# Patient Record
Sex: FEMALE | Race: BLACK OR AFRICAN AMERICAN | NOT HISPANIC OR LATINO | Employment: FULL TIME | URBAN - METROPOLITAN AREA
[De-identification: names, ages, dates, MRNs, and addresses within clinical notes are randomized per-mention and may not be internally consistent; named-entity substitution may affect disease eponyms.]

---

## 2019-01-30 ENCOUNTER — APPOINTMENT (OUTPATIENT)
Dept: WOMENS IMAGING | Facility: HOSPITAL | Age: 49
End: 2019-01-30

## 2019-01-30 PROCEDURE — 77067 SCR MAMMO BI INCL CAD: CPT | Performed by: RADIOLOGY

## 2020-02-20 ENCOUNTER — APPOINTMENT (OUTPATIENT)
Dept: WOMENS IMAGING | Facility: HOSPITAL | Age: 50
End: 2020-02-20

## 2020-02-20 PROCEDURE — 77067 SCR MAMMO BI INCL CAD: CPT | Performed by: RADIOLOGY

## 2022-03-24 ENCOUNTER — APPOINTMENT (OUTPATIENT)
Dept: WOMENS IMAGING | Facility: HOSPITAL | Age: 52
End: 2022-03-24

## 2022-03-24 PROCEDURE — 77063 BREAST TOMOSYNTHESIS BI: CPT | Performed by: RADIOLOGY

## 2022-03-24 PROCEDURE — 77067 SCR MAMMO BI INCL CAD: CPT | Performed by: RADIOLOGY

## 2022-05-09 PROBLEM — K44.9 HIATAL HERNIA: Status: ACTIVE | Noted: 2022-05-09

## 2022-05-09 PROBLEM — R06.83 SNORING: Status: ACTIVE | Noted: 2022-05-09

## 2022-05-09 PROBLEM — D64.9 ANEMIA: Status: ACTIVE | Noted: 2022-05-09

## 2022-05-09 PROBLEM — M25.562 CHRONIC PAIN OF BOTH KNEES: Status: ACTIVE | Noted: 2022-05-09

## 2022-05-09 PROBLEM — G89.29 CHRONIC PAIN OF BOTH KNEES: Status: ACTIVE | Noted: 2022-05-09

## 2022-05-09 PROBLEM — M25.561 CHRONIC PAIN OF BOTH KNEES: Status: ACTIVE | Noted: 2022-05-09

## 2022-05-09 PROBLEM — M17.0 PRIMARY OSTEOARTHRITIS OF BOTH KNEES: Status: ACTIVE | Noted: 2019-10-03

## 2022-05-09 PROBLEM — M17.0 OSTEOARTHRITIS OF BOTH KNEES: Status: ACTIVE | Noted: 2021-04-27

## 2022-05-09 PROBLEM — E78.00 HYPERCHOLESTEROLEMIA: Status: ACTIVE | Noted: 2018-04-13

## 2022-05-09 PROBLEM — R53.82 CHRONIC FATIGUE: Status: ACTIVE | Noted: 2022-05-09

## 2022-05-09 PROBLEM — K44.9 HIATAL HERNIA: Status: RESOLVED | Noted: 2022-05-09 | Resolved: 2022-05-09

## 2022-05-11 ENCOUNTER — LAB (OUTPATIENT)
Dept: LAB | Facility: HOSPITAL | Age: 52
End: 2022-05-11

## 2022-05-11 ENCOUNTER — TRANSCRIBE ORDERS (OUTPATIENT)
Dept: ADMINISTRATIVE | Facility: HOSPITAL | Age: 52
End: 2022-05-11

## 2022-05-11 ENCOUNTER — HOSPITAL ENCOUNTER (OUTPATIENT)
Dept: CARDIOLOGY | Facility: HOSPITAL | Age: 52
Discharge: HOME | End: 2022-05-11

## 2022-05-11 ENCOUNTER — CONSULT (OUTPATIENT)
Dept: BARIATRICS/WEIGHT MGMT | Facility: CLINIC | Age: 52
End: 2022-05-11

## 2022-05-11 ENCOUNTER — HOSPITAL ENCOUNTER (OUTPATIENT)
Dept: GENERAL RADIOLOGY | Facility: HOSPITAL | Age: 52
Discharge: HOME | End: 2022-05-11

## 2022-05-11 VITALS
WEIGHT: 268 LBS | RESPIRATION RATE: 18 BRPM | SYSTOLIC BLOOD PRESSURE: 127 MMHG | TEMPERATURE: 97.7 F | HEIGHT: 63 IN | BODY MASS INDEX: 47.48 KG/M2 | DIASTOLIC BLOOD PRESSURE: 78 MMHG | HEART RATE: 85 BPM

## 2022-05-11 DIAGNOSIS — K21.9 CHALASIA OF LOWER ESOPHAGEAL SPHINCTER: ICD-10-CM

## 2022-05-11 DIAGNOSIS — K21.9 GASTROESOPHAGEAL REFLUX DISEASE, UNSPECIFIED WHETHER ESOPHAGITIS PRESENT: ICD-10-CM

## 2022-05-11 DIAGNOSIS — R53.82 CHRONIC FATIGUE: ICD-10-CM

## 2022-05-11 DIAGNOSIS — E66.01 OBESITY, CLASS III, BMI 40-49.9 (MORBID OBESITY): Primary | ICD-10-CM

## 2022-05-11 DIAGNOSIS — G89.29 CHRONIC PAIN OF BOTH KNEES: ICD-10-CM

## 2022-05-11 DIAGNOSIS — Z01.818 PRE-OP TESTING: ICD-10-CM

## 2022-05-11 DIAGNOSIS — Z01.818 PRE-OP TESTING: Primary | ICD-10-CM

## 2022-05-11 DIAGNOSIS — Z71.3 DIETARY COUNSELING: ICD-10-CM

## 2022-05-11 DIAGNOSIS — E66.01 MORBID OBESITY: ICD-10-CM

## 2022-05-11 DIAGNOSIS — M25.561 CHRONIC PAIN OF BOTH KNEES: ICD-10-CM

## 2022-05-11 DIAGNOSIS — E66.01 OBESITY, CLASS III, BMI 40-49.9 (MORBID OBESITY): ICD-10-CM

## 2022-05-11 DIAGNOSIS — Z01.818 PREOPERATIVE TESTING: ICD-10-CM

## 2022-05-11 DIAGNOSIS — M25.562 CHRONIC PAIN OF BOTH KNEES: ICD-10-CM

## 2022-05-11 PROBLEM — E78.00 HYPERCHOLESTEROLEMIA: Status: RESOLVED | Noted: 2018-04-13 | Resolved: 2022-05-11

## 2022-05-11 PROBLEM — D64.9 ANEMIA: Status: RESOLVED | Noted: 2022-05-09 | Resolved: 2022-05-11

## 2022-05-11 PROBLEM — M17.0 OSTEOARTHRITIS OF BOTH KNEES: Status: RESOLVED | Noted: 2021-04-27 | Resolved: 2022-05-11

## 2022-05-11 LAB
ALBUMIN SERPL-MCNC: 4.2 G/DL (ref 3.5–5.2)
ALBUMIN/GLOB SERPL: 1.4 G/DL
ALP SERPL-CCNC: 74 U/L (ref 39–117)
ALT SERPL W P-5'-P-CCNC: 12 U/L (ref 1–33)
ANION GAP SERPL CALCULATED.3IONS-SCNC: 12.5 MMOL/L (ref 5–15)
AST SERPL-CCNC: 19 U/L (ref 1–32)
BASOPHILS # BLD AUTO: 0.08 10*3/MM3 (ref 0–0.2)
BASOPHILS NFR BLD AUTO: 0.8 % (ref 0–1.5)
BILIRUB SERPL-MCNC: 0.2 MG/DL (ref 0–1.2)
BUN SERPL-MCNC: 19 MG/DL (ref 6–20)
BUN/CREAT SERPL: 24.4 (ref 7–25)
CALCIUM SPEC-SCNC: 9.3 MG/DL (ref 8.6–10.5)
CHLORIDE SERPL-SCNC: 103 MMOL/L (ref 98–107)
CO2 SERPL-SCNC: 23.5 MMOL/L (ref 22–29)
CREAT SERPL-MCNC: 0.78 MG/DL (ref 0.57–1)
DEPRECATED RDW RBC AUTO: 41.7 FL (ref 37–54)
EGFRCR SERPLBLD CKD-EPI 2021: 92.7 ML/MIN/1.73
EOSINOPHIL # BLD AUTO: 0.71 10*3/MM3 (ref 0–0.4)
EOSINOPHIL NFR BLD AUTO: 7.5 % (ref 0.3–6.2)
ERYTHROCYTE [DISTWIDTH] IN BLOOD BY AUTOMATED COUNT: 13 % (ref 12.3–15.4)
GLOBULIN UR ELPH-MCNC: 3 GM/DL
GLUCOSE SERPL-MCNC: 85 MG/DL (ref 65–99)
HBA1C MFR BLD: 5.9 % (ref 4.8–5.6)
HCT VFR BLD AUTO: 33.4 % (ref 34–46.6)
HGB BLD-MCNC: 11 G/DL (ref 12–15.9)
IMM GRANULOCYTES # BLD AUTO: 0.02 10*3/MM3 (ref 0–0.05)
IMM GRANULOCYTES NFR BLD AUTO: 0.2 % (ref 0–0.5)
LYMPHOCYTES # BLD AUTO: 4.17 10*3/MM3 (ref 0.7–3.1)
LYMPHOCYTES NFR BLD AUTO: 44 % (ref 19.6–45.3)
MCH RBC QN AUTO: 28.9 PG (ref 26.6–33)
MCHC RBC AUTO-ENTMCNC: 32.9 G/DL (ref 31.5–35.7)
MCV RBC AUTO: 87.9 FL (ref 79–97)
MONOCYTES # BLD AUTO: 0.83 10*3/MM3 (ref 0.1–0.9)
MONOCYTES NFR BLD AUTO: 8.8 % (ref 5–12)
NEUTROPHILS NFR BLD AUTO: 3.66 10*3/MM3 (ref 1.7–7)
NEUTROPHILS NFR BLD AUTO: 38.7 % (ref 42.7–76)
NRBC BLD AUTO-RTO: 0 /100 WBC (ref 0–0.2)
PLATELET # BLD AUTO: 329 10*3/MM3 (ref 140–450)
PMV BLD AUTO: 9.2 FL (ref 6–12)
POTASSIUM SERPL-SCNC: 4.1 MMOL/L (ref 3.5–5.2)
PROT SERPL-MCNC: 7.2 G/DL (ref 6–8.5)
RBC # BLD AUTO: 3.8 10*6/MM3 (ref 3.77–5.28)
SODIUM SERPL-SCNC: 139 MMOL/L (ref 136–145)
TSH SERPL DL<=0.05 MIU/L-ACNC: 1.94 UIU/ML (ref 0.27–4.2)
WBC NRBC COR # BLD: 9.47 10*3/MM3 (ref 3.4–10.8)

## 2022-05-11 PROCEDURE — 83036 HEMOGLOBIN GLYCOSYLATED A1C: CPT

## 2022-05-11 PROCEDURE — 36415 COLL VENOUS BLD VENIPUNCTURE: CPT

## 2022-05-11 PROCEDURE — 99205 OFFICE O/P NEW HI 60 MIN: CPT | Performed by: NURSE PRACTITIONER

## 2022-05-11 PROCEDURE — 71046 X-RAY EXAM CHEST 2 VIEWS: CPT

## 2022-05-11 PROCEDURE — 80050 GENERAL HEALTH PANEL: CPT

## 2022-05-11 RX ORDER — CHLORCYCLIZINE HYDROCHLORIDE AND PSEUDOEPHEDRINE HYDROCHLORIDE 25; 60 MG/1; MG/1
1 TABLET ORAL 2 TIMES DAILY
COMMUNITY
Start: 2022-04-11 | End: 2022-05-11 | Stop reason: ALTCHOICE

## 2022-05-11 RX ORDER — ACETAMINOPHEN 500 MG
500 TABLET ORAL EVERY 6 HOURS PRN
COMMUNITY

## 2022-05-11 RX ORDER — DOXYCYCLINE HYCLATE 100 MG/1
CAPSULE ORAL
COMMUNITY
Start: 2022-04-11 | End: 2022-05-11 | Stop reason: ALTCHOICE

## 2022-05-11 RX ORDER — MECLIZINE HYDROCHLORIDE 25 MG/1
25 TABLET ORAL
COMMUNITY
Start: 2022-04-11 | End: 2022-11-21

## 2022-05-11 NOTE — PROGRESS NOTES
"Bariatric Nutrition Counseling Interview    Patient Name: Jesika Angulo    YOB: 1970   Age: 51 y.o.  Sex: female  MRN: [id number]     Procedure considering: sleeve    Height: 63.39\"            Current weight: 268 lbs   BMI: 46.90 kg/m²                          Patient weight goal: 170 lbs  Reasons for desiring weight loss: improved health, less knee pain  Additional health issues to consider: GERD, knee pain    Weight History  Highest adult weight: 268 lbs                              Lowest adult weight: 211 lbs  Onset of obesity: adulthood  Possible triggers or life events that may have led to weight gain: pregnancy, working 3rd shift     Previous Weight Loss Efforts  Patient has tried to lose weight in the past including Weight Watchers, portion control, high protein, fasting, OTC weight loss aids, prescription medications and exercise. Patient has lost up to 30 lbs on diets, but was unable to maintain this long term.     Diet history revealed no diet restrictions or food allergies. Works 3rd shift. Diet recall: 3pm: coffee with sugar and creamer; 10:30pm: meat, vegetable and starch or spaghetti with salad (half portion); 12:30pm: other half of meal from earlier; 3am: yogurt, trail mix; 5am: glass of wine and popcorn.    Beverages: coffee, sweet tea, green tea, wine  Eating out: maybe once per week    Patient identifies problem areas to be physical hunger, late night eating and inactivity     Physical activity: walking daily on lunch breaks  Barriers to activity: knee pain     Pre and post op nutrition education completed and program materials provided. Emphasized the importance of taking in at least 70 g protein and 64 oz fluid daily. Discussed personal habits and lifestyle behaviors that may influence weight loss efforts. Patient verbalized understanding and questions were answered.  Short term goals: decrease/eliminate high calorie beverages    Additional nutrition counseling available and " encouraged both pre and post op.   Patient demonstrated a good comprehension of diet requirements and commitment to make healthy changes. Jesika Angulo appears to be a suitable candidate for bariatric surgery from a nutritional standpoint.      Alba Renteria RD  05/11/22  15:45 EDT

## 2022-05-11 NOTE — PROGRESS NOTES
[facility]RIC SURGERY  [address] [facility] [address]  [facility] [id number] [phone]  [address] [id number] [phone]  Dept: [phone]  5/11/2022      Jesika Angulo.  [id number]  [id number]  1970  female      Chief Complaint of weight gain; unable to maintain weight loss    History of Present Illness:   Jesika is a 51 y.o. female who presents today for evaluation, education and consultation regarding weight loss surgery. The patient is interested in the sleeve gastrectomy.      Diet History:Jesika has been overweight for at least 30 years, has been 35 pounds or more overweight for at least 30 years, has been 100 pounds or more overweight for unknown or more years and started dieting at age 31.  The most weight Jesika lost was 30 pounds on reduced calorie diet and exercise and maintained the weight loss for 20 years. Jesika describes her eating habits as snacking between meals.  Jesika Angulo has tried Weight Watchers, Physician monitored, reduced calorie, exercising, OTC medications, prescription medications and high protein diet among others with success of losing up to 30 pounds, but in each instance regained the weight.     See dietician documentation for complete history.    Bariatric Surgery Evaluation: The patient is being seen for an initial visit for bariatric surgery evaluation and education.     Bariatric Co-morbidities:  osteoarthritis, knee pain and GERD    Patient Active Problem List   Diagnosis   • Primary osteoarthritis of both knees   • Chronic fatigue   • Snoring   • Chronic pain of both knees   • GERD (gastroesophageal reflux disease)   • Obesity, Class III, BMI 40-49.9 (morbid obesity) ([facility])   • Dietary counseling       Past Medical History:   Diagnosis Date   • GERD (gastroesophageal reflux disease)    • Primary osteoarthritis of both knees    • Snoring        Past Surgical History:   Procedure Laterality  Date   • BREAST BIOPSY Left 2017   • TOE SURGERY     • TUBAL ABDOMINAL LIGATION     • VENTRAL HERNIA REPAIR  1996    with mesh       Allergies   Allergen Reactions   • Codeine GI Intolerance   • Hydrocodone GI Intolerance         Current Outpatient Medications:   •  acetaminophen (TYLENOL) 500 MG tablet, Take 500 mg by mouth Every 6 (Six) Hours As Needed., Disp: , Rfl:   •  meclizine (ANTIVERT) 25 MG tablet, Take 25 mg by mouth., Disp: , Rfl:   •  meloxicam (MOBIC) 15 MG tablet, Take 15 mg by mouth Daily. with food, Disp: , Rfl:   •  multivitamin (multivitamin) tablet tablet, Take 1 tablet by mouth Daily., Disp: , Rfl:   •  omeprazole (priLOSEC) 40 MG capsule, Take 40 mg by mouth Daily., Disp: , Rfl:   •  Probiotic Product (PROBIOTIC ADVANCED PO), Take  by mouth., Disp: , Rfl:     Social History     Socioeconomic History   • Marital status:    Tobacco Use   • Smoking status: Never Smoker   • Smokeless tobacco: Never Used   Vaping Use   • Vaping Use: Never used   Substance and Sexual Activity   • Alcohol use: Yes     Alcohol/week: 10.0 standard drinks     Types: 10 Standard drinks or equivalent per week     Comment: 5 times a week   • Drug use: No   • Sexual activity: Defer       Family History   Problem Relation Age of Onset   • Hypertension Mother    • Obesity Mother    • Breast cancer Mother    • Hypertension Sister    • Hypertension Brother    • Breast cancer Maternal Grandmother          Review of Systems:  Review of Systems   Constitutional: Positive for fatigue.   Respiratory: Negative for chest tightness and shortness of breath.    Cardiovascular: Negative for chest pain and palpitations.   Gastrointestinal: Negative for abdominal pain.        Reflux     Musculoskeletal: Positive for arthralgias and myalgias.   All other systems reviewed and are negative.      Physical Exam:  Vital Signs:  Weight: 122 kg (268 lb)   Body mass index is 46.9 kg/m².  Temp: 97.7 °F (36.5 °C)   Heart Rate: 85   BP: 127/78      Physical Exam  Vitals reviewed.   Constitutional:       General: She is not in acute distress.     Appearance: Normal appearance. She is morbidly obese.   HENT:      Head: Normocephalic and atraumatic.      Mouth/Throat:      Mouth: Mucous membranes are moist.   Eyes:      General: No scleral icterus.     Extraocular Movements: Extraocular movements intact.      Conjunctiva/sclera: Conjunctivae normal.      Pupils: Pupils are equal, round, and reactive to light.   Cardiovascular:      Rate and Rhythm: Normal rate and regular rhythm.      Heart sounds: Normal heart sounds.   Pulmonary:      Effort: Pulmonary effort is normal. No respiratory distress.      Breath sounds: Normal breath sounds.   Abdominal:      General: Bowel sounds are normal. There is no distension.      Palpations: Abdomen is soft. There is no mass.      Tenderness: There is no abdominal tenderness. There is no guarding.   Musculoskeletal:         General: Normal range of motion.      Cervical back: Normal range of motion and neck supple.   Skin:     General: Skin is warm and dry.   Neurological:      General: No focal deficit present.      Mental Status: She is alert and oriented to person, place, and time.   Psychiatric:         Mood and Affect: Mood normal.         Behavior: Behavior normal.         Thought Content: Thought content normal.         Judgment: Judgment normal.            Assessment:         Jesika Angulo is a 51 y.o. year old female with medically complicated severe obesity. Weight: 122 kg (268 lb), Body mass index is 46.9 kg/m². and weight related problems including osteoarthritis, knee pain and GERD.    I explained in detail, potential surgical options of interest to the patient including the RNY gastric bypass, sleeve gastrectomy, and gastric band while considering the patient's medical history. At this time, the patient expressed interest in the Laparoscopic Sleeve  All of those procedures can be performed laparoscopically  but there is a chance to convert to open if any technical challenges or complications do occur.  Bariatric surgery is not cosmetic surgery but rather a tool to help a patient make a life-long commitment lifestyle changes including diet, exercise, behavior changes, and taking supplemental vitamins and minerals.    Due to the patient's BMI, history, and co-morbidities related to potential surgical complications were evaluated. Due to Jesika Angulo's risk factors female will obtain the following prior to being scheduled for surgical intervention:    A letter of medical support as well as a history and physical must be obtained from her primary care provider. The patient was given a copy of a sample form, that will suffice as their letter to take to their primary are provider.    A referral for pre-operative psychological evaluation was ordered for the patient to evaluate candidacy as well as provide mental health support, should it be warranted before or after surgery.    Notes from primary care provider and prevous lab work was reviewed and  EKG, CXR, EGD with biopsy, psychology clearance, CBC, CMP, Hba1c and TSHwere ordered at this time. These will be drawn and patient will be notified with results. Patient will complete new, pre-operative radiology prior to being scheduled for surgery.     Jesika Angulo was screened for sleep apnea in our office today and based on their results she is low risk. The risks, as they relate to chronic hypercapnia r/t untreated PAMELA were discussed with the patient and She verbalized understanding.     A pre-operative diagnostic esophagogastroduodenoscopy with biopsy for evaluation will be ordered and scheduled for this patient. The risks and benefits of the procedure were discussed with the patient in detail and all questions were answered.  Possibility of perforation, bleeding, aspiration, anoxic brain injury, respiratory and/or cardiac arrest and death were discussed.   She received  handouts regarding, all questions were answered.     Jesika Angulo verbalized understanding related to COVID-19 pre-procedure testing policies and has consented to a preoperative test 48-72 hours before She's scheduled EGD and bariatric surgical procedure.     As this patient is a female who is post-menopausal or has undergone a surgical operation which precluded her from becoming pregnant she was not counseled related to conception and pregnancy.       The risks, benefits, alternatives, and potential complications of all of the sleeve gastrectomy explained in detail including, but not limited to death, anesthesia and medication adverse effect/DVT, pulmonary embolism, trocar site/incisional hernia, wound infection, abdominal infection, bleeding, failure to lose weight or gain weight and change in body image, metabolic complications with calcium, thiamine, vitamin B12, folate, iron, and anemia.    The patient was advised to start a high protein, low fat and low carbohydrate diet  start routine exercise including but not limited to 150 minutes per week. The patient received a resistance band along with a handout of exercises. The patient was given individualized information by our dietician along with handouts.     The patient was given information regarding the MARIO educational video. MARIO is an internet based educational video which explains the sourgical procedure and answers basic questions regarding the procedure. The patient was provided with instructions and a password to watch the video.    The patient understands the surgical procedures and the different surgical options that are available.  She understands the lifestyle changes that would be required after surgery and has agreed to participate in a pre-operative and postoperative weight management program.  She also expressed understanding of possible risks, had several questions answered and desires to proceed.    I think she is a good candidate for this  surgery, and is interested in a sleeve gastrectomy.    Encounter Diagnoses   Name Primary?   • Obesity, Class III, BMI 40-49.9 (morbid obesity) (HCC) Yes   • Gastroesophageal reflux disease, unspecified whether esophagitis present    • Chronic pain of both knees    • Chronic fatigue    • Dietary counseling        Plan:    Patient will be evaluated by a bariatric dietician psychologist before undergoing a multidisciplinary review of her candidacy. We discussed weight loss requirements prior to surgery and rationale, as well as other program requirements to ensure the safest approach to surgery. We spent time discussing different surgical procedures and plan of care throughout their lifespan to ensure long term success in achieving and maintaining a healthier weight. Patient will proceed with preoperative lab work, radiology, and endoscopy after obtaining agreed upon specialty, clearances, sleep study, and letter of support from her primary care provider.    Total time spent during this encounter today was 60 minutes and includes preparing for the visit, reviewing tests, performing a medically appropriate examination and/or evaluations, counseling and educating the patient/family/caregiver, ordering medications, tests, or procedures, documenting information in the medical record and independently interpreting results and communicating that information with the patient/family/caregiver.    Florina Ziegler, APRN  5/11/2022

## 2022-05-13 ENCOUNTER — PATIENT ROUNDING (BHMG ONLY) (OUTPATIENT)
Dept: BARIATRICS/WEIGHT MGMT | Facility: CLINIC | Age: 52
End: 2022-05-13

## 2022-05-13 NOTE — PROGRESS NOTES
Good afternoon,    My name is Holly Watts, I am the Practice Manager for [facility] Bariatric Surgery.         I want to officially welcome you to our practice and ask about your recent visit.         If you could tell me about your recent visit with us. What things went well?         We're always looking for ways to make our patients experiences even better. Do you have recommendations on ways we may improve?         I appreciate you taking the time to answer a few questions today.              Thank you, and have a great day.        Message was sent to the patient via [place] [facility] [place]

## 2022-06-16 ENCOUNTER — OFFICE VISIT (OUTPATIENT)
Dept: BARIATRICS/WEIGHT MGMT | Facility: CLINIC | Age: 52
End: 2022-06-16

## 2022-06-16 VITALS
HEIGHT: 63 IN | DIASTOLIC BLOOD PRESSURE: 84 MMHG | HEART RATE: 75 BPM | SYSTOLIC BLOOD PRESSURE: 134 MMHG | TEMPERATURE: 97.7 F | WEIGHT: 262 LBS | RESPIRATION RATE: 18 BRPM | BODY MASS INDEX: 46.42 KG/M2

## 2022-06-16 DIAGNOSIS — E66.01 OBESITY, CLASS III, BMI 40-49.9 (MORBID OBESITY): Primary | ICD-10-CM

## 2022-06-16 DIAGNOSIS — Z71.3 DIETARY COUNSELING: ICD-10-CM

## 2022-06-16 DIAGNOSIS — M17.0 PRIMARY OSTEOARTHRITIS OF BOTH KNEES: ICD-10-CM

## 2022-06-16 DIAGNOSIS — R06.83 SNORING: ICD-10-CM

## 2022-06-16 DIAGNOSIS — R53.82 CHRONIC FATIGUE: ICD-10-CM

## 2022-06-16 DIAGNOSIS — K21.9 GASTROESOPHAGEAL REFLUX DISEASE, UNSPECIFIED WHETHER ESOPHAGITIS PRESENT: ICD-10-CM

## 2022-06-16 PROCEDURE — 99213 OFFICE O/P EST LOW 20 MIN: CPT | Performed by: NURSE PRACTITIONER

## 2022-06-16 NOTE — PROGRESS NOTES
[facility] BARIATRIC SURGERY  [address] [address]  [phone]  [address] [id number] [phone]  Dept: [phone]  6/16/2022      Jesika Angulo.  [id number]  [id number]  1970  female      Chief Complaint   Patient presents with   • Consult     Diet 2/4       The patient is here for month 2 of their pre-operative physician supervised diet. Today's weight is 119 kg (262 lb) pounds and had a loss of 6 lbs. The patient states that she is following the recommendations given by our office and dietician including a high lean protein, low carb and low fat diet. We recommended adequate fruits and vegetable intake along with limited portion sizes. Patient is working on eliminating fast foods, fried foods, sweets and soda. Jesika Angulo has been increasing her daily water intake. She has been exercising: by walking and going to the gym.    Patient states they have made positive changes including increasing her water intake and practicing more mindful eating.  She has been able to increase her time at the gym and frequency as she just had injections in her knee and is able to move better.  She is reading food labels and trying to make healthier choices.        Review of Systems   Constitutional: Positive for activity change.   All other systems reviewed and are negative.    Vitals:    06/16/22 1637   BP: 134/84   Pulse: 75   Resp: 18   Temp: 97.7 °F (36.5 °C)     Patient Active Problem List   Diagnosis   • Primary osteoarthritis of both knees   • Chronic fatigue   • Snoring   • Chronic pain of both knees   • GERD (gastroesophageal reflux disease)   • Obesity, Class III, BMI 40-49.9 (morbid obesity) ([facility])   • Dietary counseling     Body mass index is 45.85 kg/m².    The following portions of the patient's history were reviewed and updated as appropriate: active problem list, medication list, allergies, notes from last  encounter, lab results, imaging    Physical Exam  Vitals reviewed.   Constitutional:       General: She is not in acute distress.     Appearance: Normal appearance. She is morbidly obese.   HENT:      Head: Normocephalic and atraumatic.      Right Ear: External ear normal.      Left Ear: External ear normal.      Nose: Nose normal.      Mouth/Throat:      Mouth: Mucous membranes are moist.      Pharynx: Oropharynx is clear.   Eyes:      General: No scleral icterus.     Extraocular Movements: Extraocular movements intact.      Conjunctiva/sclera: Conjunctivae normal.      Pupils: Pupils are equal, round, and reactive to light.   Cardiovascular:      Rate and Rhythm: Normal rate and regular rhythm.      Heart sounds: Normal heart sounds. No murmur heard.    No gallop.   Pulmonary:      Effort: Pulmonary effort is normal. No respiratory distress.   Abdominal:      General: Bowel sounds are normal.      Palpations: Abdomen is soft.   Musculoskeletal:         General: Normal range of motion.      Cervical back: Normal range of motion and neck supple.   Skin:     General: Skin is warm and dry.   Neurological:      General: No focal deficit present.      Mental Status: She is alert and oriented to person, place, and time.   Psychiatric:         Mood and Affect: Mood normal.         Behavior: Behavior normal.         Thought Content: Thought content normal.         Judgment: Judgment normal.         Discussion/Plan:  Obesity/Morbid Obesity: Currently the patient's weight is decreasing. There are no medications prescribed.Treatment plan includes prescribed diet, prescribed exercise regimen and behavior modification.    I reviewed the appropriate dietary choices with the patient and encouraged the necessary changes. Recommended at least 70 grams of protein per day, around 35 grams of fats and less than 100 grams of carbohydrates. Reviewed calorie intake if patient wanted to calorie count and/or had BMR. Instructed patient to  drink half of body weight in ounces per day and exercise a minimum of 150 minutes per week including both cardio and strength training. Discussed the option of keeping a food journal which will help patient become more aware of the nutritional value of foods so they are more prepared after surgery.    The patient was given written materials from our office for education.   I answered all of the patients questions regarding dietary changes, exercise or surgical options.  The patient will follow up in 1 month. The total time spent during this encounter was 20 minutes    Encounter Diagnoses   Name Primary?   • Obesity, Class III, BMI 40-49.9 (morbid obesity) ([facility]) Yes   • Gastroesophageal reflux disease, unspecified whether esophagitis present    • Primary osteoarthritis of both knees    • Snoring    • Chronic fatigue    • Dietary counseling

## 2022-06-21 ENCOUNTER — TRANSCRIBE ORDERS (OUTPATIENT)
Dept: ADMINISTRATIVE | Facility: HOSPITAL | Age: 52
End: 2022-06-21

## 2022-06-21 DIAGNOSIS — Z01.818 OTHER SPECIFIED PRE-OPERATIVE EXAMINATION: Primary | ICD-10-CM

## 2022-06-25 ENCOUNTER — LAB (OUTPATIENT)
Dept: LAB | Facility: HOSPITAL | Age: 52
End: 2022-06-25

## 2022-06-25 DIAGNOSIS — Z01.818 OTHER SPECIFIED PRE-OPERATIVE EXAMINATION: ICD-10-CM

## 2022-06-25 DIAGNOSIS — Z01.818 PREOPERATIVE TESTING: ICD-10-CM

## 2022-06-25 LAB — SARS-COV-2 ORF1AB RESP QL NAA+PROBE: NOT DETECTED

## 2022-06-25 PROCEDURE — C9803 HOPD COVID-19 SPEC COLLECT: HCPCS

## 2022-06-25 PROCEDURE — U0004 COV-19 TEST NON-CDC HGH THRU: HCPCS

## 2022-06-28 ENCOUNTER — ANESTHESIA EVENT (OUTPATIENT)
Dept: GASTROENTEROLOGY | Facility: HOSPITAL | Age: 52
End: 2022-06-28

## 2022-06-28 ENCOUNTER — HOSPITAL ENCOUNTER (OUTPATIENT)
Facility: HOSPITAL | Age: 52
Setting detail: HOSPITAL OUTPATIENT SURGERY
Discharge: HOME | End: 2022-06-28
Attending: SURGERY | Admitting: SURGERY

## 2022-06-28 ENCOUNTER — ANESTHESIA (OUTPATIENT)
Dept: GASTROENTEROLOGY | Facility: HOSPITAL | Age: 52
End: 2022-06-28

## 2022-06-28 VITALS
WEIGHT: 266.1 LBS | DIASTOLIC BLOOD PRESSURE: 99 MMHG | RESPIRATION RATE: 16 BRPM | SYSTOLIC BLOOD PRESSURE: 133 MMHG | OXYGEN SATURATION: 94 % | BODY MASS INDEX: 45.43 KG/M2 | HEIGHT: 64 IN | HEART RATE: 73 BPM

## 2022-06-28 DIAGNOSIS — K21.9 GASTROESOPHAGEAL REFLUX DISEASE, UNSPECIFIED WHETHER ESOPHAGITIS PRESENT: ICD-10-CM

## 2022-06-28 DIAGNOSIS — M25.561 CHRONIC PAIN OF BOTH KNEES: ICD-10-CM

## 2022-06-28 DIAGNOSIS — E66.01 OBESITY, CLASS III, BMI 40-49.9 (MORBID OBESITY): ICD-10-CM

## 2022-06-28 DIAGNOSIS — G89.29 CHRONIC PAIN OF BOTH KNEES: ICD-10-CM

## 2022-06-28 DIAGNOSIS — Z71.3 DIETARY COUNSELING: ICD-10-CM

## 2022-06-28 DIAGNOSIS — R53.82 CHRONIC FATIGUE: ICD-10-CM

## 2022-06-28 DIAGNOSIS — M25.562 CHRONIC PAIN OF BOTH KNEES: ICD-10-CM

## 2022-06-28 LAB
B-HCG UR QL: NEGATIVE
EXPIRATION DATE: NORMAL
INTERNAL NEGATIVE CONTROL: NORMAL
INTERNAL POSITIVE CONTROL: NORMAL
Lab: NORMAL

## 2022-06-28 PROCEDURE — 81025 URINE PREGNANCY TEST: CPT | Performed by: SURGERY

## 2022-06-28 PROCEDURE — 87081 CULTURE SCREEN ONLY: CPT | Performed by: SURGERY

## 2022-06-28 PROCEDURE — 43239 EGD BIOPSY SINGLE/MULTIPLE: CPT | Performed by: SURGERY

## 2022-06-28 PROCEDURE — 25010000002 PROPOFOL 10 MG/ML EMULSION: Performed by: ANESTHESIOLOGY

## 2022-06-28 RX ORDER — SODIUM CHLORIDE, SODIUM LACTATE, POTASSIUM CHLORIDE, CALCIUM CHLORIDE 600; 310; 30; 20 MG/100ML; MG/100ML; MG/100ML; MG/100ML
30 INJECTION, SOLUTION INTRAVENOUS CONTINUOUS PRN
Status: DISCONTINUED | OUTPATIENT
Start: 2022-06-28 | End: 2022-06-28 | Stop reason: HOSPADM

## 2022-06-28 RX ORDER — LIDOCAINE HYDROCHLORIDE 20 MG/ML
INJECTION, SOLUTION INFILTRATION; PERINEURAL AS NEEDED
Status: DISCONTINUED | OUTPATIENT
Start: 2022-06-28 | End: 2022-06-28 | Stop reason: SURG

## 2022-06-28 RX ORDER — PROPOFOL 10 MG/ML
VIAL (ML) INTRAVENOUS AS NEEDED
Status: DISCONTINUED | OUTPATIENT
Start: 2022-06-28 | End: 2022-06-28 | Stop reason: SURG

## 2022-06-28 RX ORDER — PROPOFOL 10 MG/ML
VIAL (ML) INTRAVENOUS CONTINUOUS PRN
Status: DISCONTINUED | OUTPATIENT
Start: 2022-06-28 | End: 2022-06-28 | Stop reason: SURG

## 2022-06-28 RX ADMIN — SODIUM CHLORIDE, POTASSIUM CHLORIDE, SODIUM LACTATE AND CALCIUM CHLORIDE 30 ML/HR: 600; 310; 30; 20 INJECTION, SOLUTION INTRAVENOUS at 07:56

## 2022-06-28 RX ADMIN — PROPOFOL 200 MG: 10 INJECTION, EMULSION INTRAVENOUS at 08:07

## 2022-06-28 RX ADMIN — Medication 200 MCG/KG/MIN: at 08:07

## 2022-06-28 RX ADMIN — LIDOCAINE HYDROCHLORIDE 60 MG: 20 INJECTION, SOLUTION INFILTRATION; PERINEURAL at 08:07

## 2022-06-28 NOTE — ANESTHESIA POSTPROCEDURE EVALUATION
Patient: Jesika Angulo    Procedure Summary     Date: 06/28/22 Room / Location:  [facility] /  [facility]    Anesthesia Start: 0802 Anesthesia Stop: 0815    Procedure: ESOPHAGOGASTRODUODENOSCOPY WITH BIOPSY (N/A Esophagus) Diagnosis:       Obesity, Class III, BMI 40-49.9 (morbid obesity) ([facility])      Gastroesophageal reflux disease, unspecified whether esophagitis present      Chronic pain of both knees      Chronic fatigue      Dietary counseling      (Obesity, Class III, BMI 40-49.9 (morbid obesity) ([facility]) [E66.01])      (Gastroesophageal reflux disease, unspecified whether esophagitis present [K21.9])      (Chronic pain of both knees [M25.561, M25.562, G89.29])      (Chronic fatigue [R53.82])      (Dietary counseling [Z71.3])    Surgeons: Lm Recio Jr., MD Provider: Stan Zepeda MD    Anesthesia Type: MAC ASA Status: 3          Anesthesia Type: MAC    Vitals  No vitals data found for the desired time range.          Post Anesthesia Care and Evaluation    Patient location during evaluation: PHASE II  Patient participation: complete - patient participated  Level of consciousness: awake and alert  Pain management: adequate    Airway patency: patent  Anesthetic complications: No anesthetic complications  PONV Status: none  Cardiovascular status: acceptable and hemodynamically stable  Respiratory status: acceptable, nonlabored ventilation and spontaneous ventilation  Hydration status: acceptable

## 2022-06-28 NOTE — ANESTHESIA PREPROCEDURE EVALUATION
Anesthesia Evaluation     Patient summary reviewed and Nursing notes reviewed                Airway   Mallampati: III  TM distance: >3 FB  Neck ROM: full  Possible difficult intubation  Dental - normal exam     Pulmonary - normal exam   Cardiovascular - normal exam        Neuro/Psych  GI/Hepatic/Renal/Endo    (+) obesity, morbid obesity, GERD,      Musculoskeletal     Abdominal   (+) obese,    Substance History      OB/GYN          Other   arthritis,                      Anesthesia Plan    ASA 3     MAC     intravenous induction     Anesthetic plan, risks, benefits, and alternatives have been provided, discussed and informed consent has been obtained with: patient.        CODE STATUS:

## 2022-06-29 ENCOUNTER — TELEPHONE (OUTPATIENT)
Dept: BARIATRICS/WEIGHT MGMT | Facility: CLINIC | Age: 52
End: 2022-06-29

## 2022-06-29 LAB — UREASE TISS QL: NEGATIVE

## 2022-06-29 NOTE — TELEPHONE ENCOUNTER
Inform about results EGD  ----- Message from Lm Recio Jr., MD sent at 6/29/2022 11:20 AM EDT -----  Please call patient with negative results.

## 2022-07-15 ENCOUNTER — HOSPITAL ENCOUNTER (OUTPATIENT)
Dept: CARDIOLOGY | Facility: HOSPITAL | Age: 52
Discharge: HOME | End: 2022-07-15
Admitting: NURSE PRACTITIONER

## 2022-07-15 ENCOUNTER — OFFICE VISIT (OUTPATIENT)
Dept: BARIATRICS/WEIGHT MGMT | Facility: CLINIC | Age: 52
End: 2022-07-15

## 2022-07-15 VITALS
DIASTOLIC BLOOD PRESSURE: 91 MMHG | WEIGHT: 261 LBS | TEMPERATURE: 97.8 F | BODY MASS INDEX: 46.25 KG/M2 | HEIGHT: 63 IN | HEART RATE: 81 BPM | RESPIRATION RATE: 17 BRPM | SYSTOLIC BLOOD PRESSURE: 129 MMHG

## 2022-07-15 DIAGNOSIS — R53.82 CHRONIC FATIGUE: ICD-10-CM

## 2022-07-15 DIAGNOSIS — M25.562 CHRONIC PAIN OF BOTH KNEES: ICD-10-CM

## 2022-07-15 DIAGNOSIS — G89.29 CHRONIC PAIN OF BOTH KNEES: ICD-10-CM

## 2022-07-15 DIAGNOSIS — Z71.3 DIETARY COUNSELING: ICD-10-CM

## 2022-07-15 DIAGNOSIS — M25.561 CHRONIC PAIN OF BOTH KNEES: ICD-10-CM

## 2022-07-15 DIAGNOSIS — E66.01 OBESITY, CLASS III, BMI 40-49.9 (MORBID OBESITY): Primary | ICD-10-CM

## 2022-07-15 DIAGNOSIS — E66.01 OBESITY, CLASS III, BMI 40-49.9 (MORBID OBESITY): ICD-10-CM

## 2022-07-15 DIAGNOSIS — K21.9 GASTROESOPHAGEAL REFLUX DISEASE, UNSPECIFIED WHETHER ESOPHAGITIS PRESENT: ICD-10-CM

## 2022-07-15 DIAGNOSIS — R06.83 SNORING: ICD-10-CM

## 2022-07-15 LAB — QT INTERVAL: 406 MS

## 2022-07-15 PROCEDURE — 93010 ELECTROCARDIOGRAM REPORT: CPT | Performed by: INTERNAL MEDICINE

## 2022-07-15 PROCEDURE — 99213 OFFICE O/P EST LOW 20 MIN: CPT | Performed by: NURSE PRACTITIONER

## 2022-07-15 PROCEDURE — 93005 ELECTROCARDIOGRAM TRACING: CPT | Performed by: NURSE PRACTITIONER

## 2022-07-15 NOTE — PROGRESS NOTES
[facility]ATR[facility] BARIATRIC SURGERY  [address] [facility] [address]  [facility] [address]  [phone]  [address] [id number] [phone]  Dept: [phone]  7/15/2022      Jesika Angulo.  [id number]  [id number]  1970  female      Chief Complaint   Patient presents with   • Follow-up     Diet 3/4       The patient is here for month 3 of their pre-operative physician supervised diet. Today's weight is 118 kg (261 lb) pounds and had a loss of 1 lbs. The patient states that she is following the recommendations given by our office and dietician including a high lean protein, low carb and low fat diet. We recommended adequate fruits and vegetable intake along with limited portion sizes. Patient is working on eliminating fast foods, fried foods, sweets and soda. Jesika Angulo has been increasing her daily water intake. She has been exercising: by walking.  She has been walking every morning and evening and then 3 times per week she does an extra workout.  Exercise is somewhat limited due to knee pain.    Patient states they have made positive changes including changing over her creamer to sugar free creamer.  She has decreased the amount of sugar that she is drinking.  She has tried protein shakes and know brands and flavors of ones that she likes.  She is reading food labels and focusing on high protein foods.      Review of Systems   Constitutional: Positive for activity change and appetite change.   All other systems reviewed and are negative.    Vitals:    07/15/22 1012   BP: 129/91   Pulse: 81   Resp: 17   Temp: 97.8 °F (36.6 °C)     Patient Active Problem List   Diagnosis   • Primary osteoarthritis of both knees   • Chronic fatigue   • Snoring   • Chronic pain of both knees   • GERD (gastroesophageal reflux disease)   • Obesity, Class III, BMI 40-49.9 (morbid obesity) (HCC)   • Dietary counseling     Body mass index is 45.67 kg/m².    The  following portions of the patient's history were reviewed and updated as appropriate: active problem list, medication list, allergies, notes from last encounter, lab results, endoscopy procedure notes, imaging    Physical Exam  Vitals reviewed.   Constitutional:       General: She is not in acute distress.     Appearance: Normal appearance. She is morbidly obese.   HENT:      Head: Normocephalic and atraumatic.      Right Ear: External ear normal.      Left Ear: External ear normal.      Nose: Nose normal.      Mouth/Throat:      Mouth: Mucous membranes are moist.      Pharynx: Oropharynx is clear.   Eyes:      General: No scleral icterus.     Extraocular Movements: Extraocular movements intact.      Conjunctiva/sclera: Conjunctivae normal.      Pupils: Pupils are equal, round, and reactive to light.   Cardiovascular:      Rate and Rhythm: Normal rate and regular rhythm.      Heart sounds: Normal heart sounds. No murmur heard.    No gallop.   Pulmonary:      Effort: Pulmonary effort is normal. No respiratory distress.   Abdominal:      General: Bowel sounds are normal.      Palpations: Abdomen is soft.   Musculoskeletal:         General: Normal range of motion.      Cervical back: Normal range of motion and neck supple.   Skin:     General: Skin is warm and dry.   Neurological:      General: No focal deficit present.      Mental Status: She is alert and oriented to person, place, and time.   Psychiatric:         Mood and Affect: Mood normal.         Behavior: Behavior normal.         Thought Content: Thought content normal.         Judgment: Judgment normal.         Discussion/Plan:  Obesity/Morbid Obesity: Currently the patient's weight is decreasing. There are no medications prescribed.Treatment plan includes prescribed diet, prescribed exercise regimen and behavior modification.    I reviewed the appropriate dietary choices with the patient and encouraged the necessary changes. Recommended at least 70 grams of  protein per day, around 35 grams of fats and less than 100 grams of carbohydrates. Reviewed calorie intake if patient wanted to calorie count and/or had BMR. Instructed patient to drink half of body weight in ounces per day and exercise a minimum of 150 minutes per week including both cardio and strength training. Discussed the option of keeping a food journal which will help patient become more aware of the nutritional value of foods so they are more prepared after surgery.    The patient was given written materials from our office for education.   I answered all of the patients questions regarding dietary changes, exercise or surgical options.  The patient will follow up in 1 month. The total time spent during this encounter was 20 minutes    Encounter Diagnoses   Name Primary?   • Obesity, Class III, BMI 40-49.9 (morbid obesity) ([facility]) Yes   • Gastroesophageal reflux disease, unspecified whether esophagitis present    • Chronic pain of both knees    • Snoring    • Chronic fatigue    • Dietary counseling

## 2022-08-30 ENCOUNTER — OFFICE VISIT (OUTPATIENT)
Dept: BARIATRICS/WEIGHT MGMT | Facility: CLINIC | Age: 52
End: 2022-08-30

## 2022-08-30 VITALS
TEMPERATURE: 97.1 F | WEIGHT: 270 LBS | SYSTOLIC BLOOD PRESSURE: 142 MMHG | BODY MASS INDEX: 47.84 KG/M2 | DIASTOLIC BLOOD PRESSURE: 93 MMHG | HEIGHT: 63 IN | HEART RATE: 80 BPM

## 2022-08-30 DIAGNOSIS — K21.9 GASTROESOPHAGEAL REFLUX DISEASE, UNSPECIFIED WHETHER ESOPHAGITIS PRESENT: ICD-10-CM

## 2022-08-30 DIAGNOSIS — E66.01 OBESITY, CLASS III, BMI 40-49.9 (MORBID OBESITY): Primary | ICD-10-CM

## 2022-08-30 DIAGNOSIS — R53.82 CHRONIC FATIGUE: ICD-10-CM

## 2022-08-30 DIAGNOSIS — Z71.3 DIETARY COUNSELING: ICD-10-CM

## 2022-08-30 DIAGNOSIS — G89.29 CHRONIC PAIN OF BOTH KNEES: ICD-10-CM

## 2022-08-30 DIAGNOSIS — M25.561 CHRONIC PAIN OF BOTH KNEES: ICD-10-CM

## 2022-08-30 DIAGNOSIS — M17.0 PRIMARY OSTEOARTHRITIS OF BOTH KNEES: ICD-10-CM

## 2022-08-30 DIAGNOSIS — M25.562 CHRONIC PAIN OF BOTH KNEES: ICD-10-CM

## 2022-08-30 DIAGNOSIS — R06.83 SNORING: ICD-10-CM

## 2022-08-30 PROCEDURE — 99213 OFFICE O/P EST LOW 20 MIN: CPT | Performed by: NURSE PRACTITIONER

## 2022-08-30 NOTE — PROGRESS NOTES
[facility] BARIATR[facility] BARIATRIC SURGERY  [address] [address]  [phone]  [address] [address]  [phone]  Dept: [phone]  8/30/2022      Jesika Angulo.  [id number]  [id number]  1970  female      Chief Complaint   Patient presents with   • Obesity     Diet  4/4       The patient is here for month 4 of their pre-operative physician supervised diet. Today's weight is 122 kg (270 lb) pounds and had a gain of 9 lbs. The patient states that she is following the recommendations given by our office and dietician including a high lean protein, low carb and low fat diet. We recommended adequate fruits and vegetable intake along with limited portion sizes. Patient is working on eliminating fast foods, fried foods, sweets and soda. Jesika Angulo has been increasing her daily water intake. She has been exercising: by walking.    Patient states they have made positive changes including increasing water intake and decreasing sugar intake.    The patient admits to be struggling with illness.  Over the last month she has been sick with COVID and a sinus infection.  She was placed on steroids which she just now finished.    Review of Systems   Constitutional: Positive for appetite change.   All other systems reviewed and are negative.    Vitals:    08/30/22 0921   BP: 142/93   Pulse: 80   Temp: 97.1 °F (36.2 °C)     Patient Active Problem List   Diagnosis   • Primary osteoarthritis of both knees   • Chronic fatigue   • Snoring   • Chronic pain of both knees   • GERD (gastroesophageal reflux disease)   • Obesity, Class III, BMI 40-49.9 (morbid obesity) ([facility])   • Dietary counseling     Body mass index is 47.25 kg/m².    The following portions of the patient's history were reviewed and updated as appropriate: active problem list, medication list, allergies, notes from last encounter, notes from last 4 encounters, lab results,  endoscopy procedure notes, imaging    Physical Exam  Vitals reviewed.   Constitutional:       General: She is not in acute distress.     Appearance: Normal appearance. She is morbidly obese.   HENT:      Head: Normocephalic and atraumatic.      Mouth/Throat:      Mouth: Mucous membranes are moist.      Pharynx: Oropharynx is clear.   Eyes:      General: No scleral icterus.     Extraocular Movements: Extraocular movements intact.      Conjunctiva/sclera: Conjunctivae normal.      Pupils: Pupils are equal, round, and reactive to light.   Cardiovascular:      Rate and Rhythm: Normal rate and regular rhythm.      Heart sounds: Normal heart sounds. No murmur heard.    No gallop.   Pulmonary:      Effort: Pulmonary effort is normal. No respiratory distress.   Abdominal:      General: Bowel sounds are normal.      Palpations: Abdomen is soft.   Musculoskeletal:         General: Normal range of motion.      Cervical back: Normal range of motion and neck supple.   Skin:     General: Skin is warm and dry.   Neurological:      General: No focal deficit present.      Mental Status: She is alert and oriented to person, place, and time.   Psychiatric:         Mood and Affect: Mood normal.         Behavior: Behavior normal.         Thought Content: Thought content normal.         Judgment: Judgment normal.         Discussion/Plan:  Obesity/Morbid Obesity: Currently the patient's weight is stable. There are no medications prescribed.Treatment plan includes prescribed diet, prescribed exercise regimen and behavior modification.    I reviewed the appropriate dietary choices with the patient and encouraged the necessary changes. Recommended at least 70 grams of protein per day, around 35 grams of fats and less than 100 grams of carbohydrates. Reviewed calorie intake if patient wanted to calorie count and/or had BMR. Instructed patient to drink half of body weight in ounces per day and exercise a minimum of 150 minutes per week  including both cardio and strength training. Discussed the option of keeping a food journal which will help patient become more aware of the nutritional value of foods so they are more prepared after surgery.    The patient was given written materials from our office for education.   I answered all of the patients questions regarding dietary changes, exercise or surgical options.  The patient will follow up in 1 month. The total time spent during this encounter was 20 minutes    Encounter Diagnoses   Name Primary?   • Obesity, Class III, BMI 40-49.9 (morbid obesity) ([facility]) Yes   • Gastroesophageal reflux disease, unspecified whether esophagitis present    • Primary osteoarthritis of both knees    • Chronic pain of both knees    • Snoring    • Chronic fatigue    • Dietary counseling

## 2022-09-27 ENCOUNTER — TELEPHONE (OUTPATIENT)
Dept: BARIATRICS/WEIGHT MGMT | Facility: CLINIC | Age: 52
End: 2022-09-27

## 2022-10-03 ENCOUNTER — TELEPHONE (OUTPATIENT)
Dept: BARIATRICS/WEIGHT MGMT | Facility: CLINIC | Age: 52
End: 2022-10-03

## 2022-10-04 ENCOUNTER — TELEPHONE (OUTPATIENT)
Dept: BARIATRICS/WEIGHT MGMT | Facility: CLINIC | Age: 52
End: 2022-10-04

## 2022-12-07 ENCOUNTER — PREP FOR SURGERY (OUTPATIENT)
Dept: OTHER | Facility: HOSPITAL | Age: 52
End: 2022-12-07

## 2022-12-07 DIAGNOSIS — E66.01 OBESITY, CLASS III, BMI 40-49.9 (MORBID OBESITY): Primary | ICD-10-CM

## 2022-12-07 RX ORDER — SODIUM CHLORIDE 0.9 % (FLUSH) 0.9 %
3-10 SYRINGE (ML) INJECTION AS NEEDED
Status: CANCELLED | OUTPATIENT
Start: 2022-12-28

## 2022-12-07 RX ORDER — CEFAZOLIN SODIUM IN 0.9 % NACL 3 G/100 ML
3 INTRAVENOUS SOLUTION, PIGGYBACK (ML) INTRAVENOUS ONCE
Status: CANCELLED | OUTPATIENT
Start: 2022-12-28 | End: 2022-12-07

## 2022-12-07 RX ORDER — SODIUM CHLORIDE 9 MG/ML
40 INJECTION, SOLUTION INTRAVENOUS AS NEEDED
Status: CANCELLED | OUTPATIENT
Start: 2022-12-28

## 2022-12-07 RX ORDER — GABAPENTIN 250 MG/5ML
300 SOLUTION ORAL ONCE
Status: CANCELLED | OUTPATIENT
Start: 2022-12-28 | End: 2022-12-07

## 2022-12-07 RX ORDER — PANTOPRAZOLE SODIUM 40 MG/10ML
40 INJECTION, POWDER, LYOPHILIZED, FOR SOLUTION INTRAVENOUS ONCE
Status: CANCELLED | OUTPATIENT
Start: 2022-12-28 | End: 2022-12-07

## 2022-12-07 RX ORDER — SODIUM CHLORIDE 0.9 % (FLUSH) 0.9 %
3 SYRINGE (ML) INJECTION EVERY 12 HOURS SCHEDULED
Status: CANCELLED | OUTPATIENT
Start: 2022-12-07

## 2022-12-07 RX ORDER — SCOLOPAMINE TRANSDERMAL SYSTEM 1 MG/1
1 PATCH, EXTENDED RELEASE TRANSDERMAL CONTINUOUS
Status: CANCELLED | OUTPATIENT
Start: 2022-12-28 | End: 2022-12-31

## 2022-12-07 RX ORDER — PROMETHAZINE HYDROCHLORIDE 12.5 MG/1
12.5 TABLET ORAL ONCE AS NEEDED
Status: CANCELLED | OUTPATIENT
Start: 2022-12-28

## 2022-12-07 RX ORDER — PROMETHAZINE HYDROCHLORIDE 25 MG/1
25 SUPPOSITORY RECTAL ONCE AS NEEDED
Status: CANCELLED | OUTPATIENT
Start: 2022-12-28

## 2022-12-07 RX ORDER — SODIUM CHLORIDE, SODIUM LACTATE, POTASSIUM CHLORIDE, CALCIUM CHLORIDE 600; 310; 30; 20 MG/100ML; MG/100ML; MG/100ML; MG/100ML
100 INJECTION, SOLUTION INTRAVENOUS CONTINUOUS
Status: CANCELLED | OUTPATIENT
Start: 2022-12-28

## 2022-12-07 RX ORDER — METOCLOPRAMIDE HYDROCHLORIDE 5 MG/ML
10 INJECTION INTRAMUSCULAR; INTRAVENOUS ONCE
Status: CANCELLED | OUTPATIENT
Start: 2022-12-28 | End: 2022-12-07

## 2022-12-07 RX ORDER — CHLORHEXIDINE GLUCONATE 0.12 MG/ML
15 RINSE ORAL SEE ADMIN INSTRUCTIONS
Status: CANCELLED | OUTPATIENT
Start: 2022-12-28

## 2022-12-15 ENCOUNTER — CONSULT (OUTPATIENT)
Dept: BARIATRICS/WEIGHT MGMT | Facility: CLINIC | Age: 52
End: 2022-12-15

## 2022-12-15 VITALS — HEART RATE: 84 BPM | SYSTOLIC BLOOD PRESSURE: 145 MMHG | DIASTOLIC BLOOD PRESSURE: 92 MMHG | TEMPERATURE: 97.3 F

## 2022-12-15 DIAGNOSIS — M25.561 CHRONIC PAIN OF BOTH KNEES: ICD-10-CM

## 2022-12-15 DIAGNOSIS — G89.29 CHRONIC PAIN OF BOTH KNEES: ICD-10-CM

## 2022-12-15 DIAGNOSIS — M25.562 CHRONIC PAIN OF BOTH KNEES: ICD-10-CM

## 2022-12-15 DIAGNOSIS — K44.9 HIATAL HERNIA: ICD-10-CM

## 2022-12-15 DIAGNOSIS — Z71.3 DIETARY COUNSELING: ICD-10-CM

## 2022-12-15 DIAGNOSIS — K21.9 GASTROESOPHAGEAL REFLUX DISEASE, UNSPECIFIED WHETHER ESOPHAGITIS PRESENT: ICD-10-CM

## 2022-12-15 DIAGNOSIS — R53.82 CHRONIC FATIGUE: ICD-10-CM

## 2022-12-15 DIAGNOSIS — E66.01 OBESITY, CLASS III, BMI 40-49.9 (MORBID OBESITY): Primary | ICD-10-CM

## 2022-12-15 PROCEDURE — 99215 OFFICE O/P EST HI 40 MIN: CPT | Performed by: SURGERY

## 2022-12-15 RX ORDER — ENOXAPARIN SODIUM 100 MG/ML
40 INJECTION SUBCUTANEOUS EVERY 12 HOURS SCHEDULED
Qty: 11.2 ML | Refills: 0 | Status: SHIPPED | OUTPATIENT
Start: 2022-12-15 | End: 2022-12-30

## 2022-12-15 RX ORDER — URSODIOL 300 MG/1
300 CAPSULE ORAL 2 TIMES DAILY
Qty: 60 CAPSULE | Refills: 5 | Status: SHIPPED | OUTPATIENT
Start: 2022-12-15

## 2022-12-15 RX ORDER — CYANOCOBALAMIN/FOLIC AC/VIT B6 1-2.5-25MG
TABLET ORAL
Qty: 90 TABLET | OUTPATIENT
Start: 2022-12-15

## 2022-12-15 NOTE — H&P (VIEW-ONLY)
Bariatric Consult:  Referred by Lee Best MD    Jesika Angulo is here today for consult on Consult and Weight Loss (Sleeve consultation)      History of Present Illness:     Jesika Angulo is a 52 y.o. female with morbid obesity with co-morbidities including GERD who presents for surgical consultation for the above procedure. Jesika has completed the initial intake visit and has been examined by our nurse practitioner, dietician, psychologist and underwent the extensive educational teaching process under the guidance of our bariatric coordinator and myself. Jesika also has seen or if has not yet will see the educational video MARIO on the surgical procedure if available. Jesika attended today more educational teaching from our bariatric coordinator and myself. Jesika has had an extensive medical workup including a visit with their primary care physician, EKG, chest radiograph, blood work, EGD or UGI and possibly further testing. These have been reviewed by me and discussed with the patient. Jesika is now ready to proceed with surgery. Jesika presently denies nausea, vomiting, fever, chills, chest pain, shortness of air, melena, hematochezia, hemetemesis, dysuria, frequency, hematuria.       Past Medical History:   Diagnosis Date   • GERD (gastroesophageal reflux disease)    • Primary osteoarthritis of both knees    • Snoring        Encounter Diagnoses   Name Primary?   • Obesity, Class III, BMI 40-49.9 (morbid obesity) ([facility]) Yes   • Dietary counseling    • Gastroesophageal reflux disease, unspecified whether esophagitis present    • Hiatal hernia    • Chronic fatigue    • Chronic pain of both knees        Past Surgical History:   Procedure Laterality Date   • BREAST BIOPSY Left 2017   • ENDOSCOPY N/A 6/28/2022    Procedure: ESOPHAGOGASTRODUODENOSCOPY WITH BIOPSY;  Surgeon: Lm Recio Jr., MD;  Location: [facility] ENDOSCOPY;  Service: General;  Laterality: N/A;  PRE- GERD  POST- GASTRITIS   • TOE  SURGERY     • TUBAL ABDOMINAL LIGATION     • VENTRAL HERNIA REPAIR  1996    with mesh       Patient Active Problem List   Diagnosis   • Primary osteoarthritis of both knees   • Chronic fatigue   • Snoring   • Hiatal hernia   • Chronic pain of both knees   • GERD (gastroesophageal reflux disease)   • Obesity, Class III, BMI 40-49.9 (morbid obesity) ([facility])   • Dietary counseling       Allergies   Allergen Reactions   • Codeine GI Intolerance   • Hydrocodone GI Intolerance         Current Outpatient Medications:   •  acetaminophen (TYLENOL) 500 MG tablet, Take 500 mg by mouth Every 6 (Six) Hours As Needed., Disp: , Rfl:   •  multivitamin (THERAGRAN) tablet tablet, Take 1 tablet by mouth Daily., Disp: , Rfl:   •  omeprazole (priLOSEC) 40 MG capsule, Take 40 mg by mouth Daily., Disp: , Rfl:   •  Probiotic Product (PROBIOTIC ADVANCED PO), Take  by mouth., Disp: , Rfl:   •  brompheniramine-pseudoephedrine-DM 30-2-10 MG/5ML syrup, Take 5 mL by mouth 4 (Four) Times a Day As Needed for Congestion or Cough., Disp: 120 mL, Rfl: 0  •  Enoxaparin Sodium (LOVENOX) 40 MG/0.4ML solution prefilled syringe syringe, Inject 0.4 mL under the skin into the appropriate area as directed Every 12 (Twelve) Hours for 14 days. Start after surgery unless instructed otherwise, Disp: 11.2 mL, Rfl: 0  •  folic acid-vit B6-vit B12 (FOLBEE) 2.5-25-1 MG tablet tablet, Take 1 tablet by mouth Daily., Disp: 40 tablet, Rfl: 0  •  ursodiol (Actigall) 300 MG capsule, Take 1 capsule by mouth 2 (Two) Times a Day., Disp: 60 capsule, Rfl: 5    Social History     Socioeconomic History   • Marital status:    Tobacco Use   • Smoking status: Never   • Smokeless tobacco: Never   Vaping Use   • Vaping Use: Never used   Substance and Sexual Activity   • Alcohol use: Yes     Alcohol/week: 10.0 standard drinks     Types: 10 Standard drinks or equivalent per week     Comment: 5 times a week   • Drug use: No   • Sexual activity: Defer       Family History   Problem  Relation Age of Onset   • Hypertension Mother    • Obesity Mother    • Breast cancer Mother    • Hypertension Sister    • Hypertension Brother    • Breast cancer Maternal Grandmother    • Malig Hyperthermia Neg Hx        Review of Systems:  Review of Systems   Constitutional: Positive for fatigue.   Genitourinary: Positive for dysuria.   Musculoskeletal: Positive for arthralgias.   All other systems reviewed and are negative.        Physical Exam:    Vital Signs:      There is no height or weight on file to calculate BMI.  Temp: 97.3 °F (36.3 °C)   Heart Rate: 84   BP: 145/92       Physical Exam  Vitals reviewed.   HENT:      Head: Normocephalic and atraumatic.      Mouth/Throat:      Mouth: Mucous membranes are moist.      Pharynx: Oropharynx is clear.   Eyes:      General: No scleral icterus.     Extraocular Movements: Extraocular movements intact.      Conjunctiva/sclera: Conjunctivae normal.      Pupils: Pupils are equal, round, and reactive to light.   Neck:      Thyroid: No thyromegaly.   Cardiovascular:      Rate and Rhythm: Normal rate.   Pulmonary:      Effort: Pulmonary effort is normal. No respiratory distress.      Breath sounds: Normal breath sounds. No stridor. No wheezing or rhonchi.   Abdominal:      General: Bowel sounds are normal.      Palpations: Abdomen is soft.      Tenderness: There is no abdominal tenderness. There is no right CVA tenderness, left CVA tenderness, guarding or rebound.      Hernia: No hernia is present.   Musculoskeletal:         General: Normal range of motion.      Cervical back: Normal range of motion and neck supple.   Lymphadenopathy:      Cervical: No cervical adenopathy.   Skin:     General: Skin is warm and dry.      Findings: No erythema.   Neurological:      Mental Status: She is alert and oriented to person, place, and time.   Psychiatric:         Mood and Affect: Mood normal.         Behavior: Behavior normal.         Thought Content: Thought content normal.          Judgment: Judgment normal.           Assessment:    Jesika Angulo is a 52 y.o. year old female with medically complicated severe obesity with a BMI of There is no height or weight on file to calculate BMI. and multiple co-morbidities listed in the encounter diagnosis.    I think she is an appropriate candidate for this surgery, and is ready to proceed.      Plan/Discussion/Summary:  Small to moderate size hiatal hernia per me.  Patient does take PPI.  H. pylori negative.  Patient complains of dysuria and instructed patient to talk to her PCP today for further evaluation.    The patient has returned to the office for a surgical consultation and has requested to proceed with gastric sleeve.  I have had the opportunity to obtain a history, examine the patient and review the patient's chart. The procedure could be done laparoscopically and or robotically.    The patient understands that surgery is a tool and that weight loss is not guaranteed but only seen in the context of appropriate use, regular follow up, exercise and making appropriate food choices.     I personally discussed the potential complications of the laparoscopic gastric sleeve with this patient.  The patient is well aware of potential complications of the surgery that include but not limited to bleeding, infections, deep vein thrombosis, pulmonary embolism, pulmonary complications such as pneumonia, cardiac event, hernias, small bowel obstruction, damage to the spleen or other organs, bowel injury, disfiguring scars, failure to lose weight, need for additional surgery, conversion to an open procedure and death.  The patient is also aware of complications which apply in particular to the gastric sleeve and can include but not limited to the leakage of gastric contents at the staple line, the development of an intra-abdominal abscess, gastroesophageal reflux disease, Henning's esophagus, ulcers, vitamin/mineral deficiencies, strictures, and the  possibility of converting this procedure to a Sonja-en-Y gastric bypass. The patient also understands the possibility of requiring an acid reducer medication for the rest of their life.    The risks, benefits, potential complications and alternative therapies were discussed at great length as outlined in our extensive consent forms, online consent and educational teaching processes.    The patient has confirmed the participation in the programs extensive educational activities.    All questions and concerns were answered to patient's satisfaction.  The patient now wishes to proceed with surgery.     The patient has agreed to a postoperative course of anitcoagulant therapy.      I instructed patient that the surgery could be laparoscopically and/or robotically.    I instructed patient to start on a H2 blocker or proton pump inhibitor if not already on one of these medications.    I explained in detail the procedures that are in the consent.  All of these procedures have a chance to convert to open if any technical challenges or complications do occur.  Bariatric surgery is not cosmetic surgery but rather a tool to help a patient make a life-long commitment lifestyle change including diet, exercise, behavior changes, and taking supplemental vitamins and minerals.    Problems after surgery may require more operations to correct them.    The risks, benefits, alternatives, and potential complications of all of the procedures were explained in detail including, but not limited to death, anesthesia and medication adverse effect, deep venous thrombosis, pulmonary embolism, trocar site/incisional hernia, wound infection, abdominal infection, bleeding, failure to lose weight, gain weight, a change in body image, metabolic complications with vitamin deficiences and anemia.    Weight loss expectations were discussed with the patient in detail. The weight loss operations most commonly performed are the sleeve gastrectomy and the  Sonja-en-Y gastric bypass. These operations result in weight losses up to approximately 25-35% of initial body weight 12 to 24 months after surgery with the gastric bypass usually the higher percent of weight loss but depends on patient using the tool.    For the gastric bypass and loop duodenal switch (KELTON-S) the risks include but not limited to the following early complications:  Anastomotic leak/peritonitis, Sonja/Alimentary/biliopancreatic limb obstruction, severe & minor wound infection/seroma, and nausea/vomiting.  Late complications can include but are not limited to malnutrition, vitamin deficiencies, frequent loose stools,  stomal stenosis, marginal ulcer, bowel obstruction, intussusception, internal, and incisional hernia.    Regarding the gastric sleeve, there is higher risk of dysphagia and reflux leading to possible Henning's esophagus compared to a gastric bypass, as well as risk of internal visceral/organ injury, splenectomy, bleeding, infection, leak (which could require further intervention possible conversion to Sonja-en-Y gastric bypass), stenosis and possibility of regaining weight.    Jesika was counseled regarding diagnostic results, instructions for management, risk factor reductions, prognosis, patient and family education, impressions, risks and benefits of treatment options and importance of compliance with treatment. Total time of the encounter was over 45 minutes counseling the patient regarding the procedure as above and reviewing as well as ordering labs, medications and the procedure.  The chart was also reviewed prior to seeing the patient reviewing previous testing, studies and labs.    Jesika understands the surgical procedures and the different surgical options that are available.  She understands the lifestyle changes that are required after surgery and has agreed to follow the guidelines outlined in the weight management program.  She also expressed understanding of the risks  involved and had all of female questions answered and desires to proceed.      Lm Recio MD  12/15/2022

## 2022-12-15 NOTE — PATIENT INSTRUCTIONS
Bariatric Manual    You were provided a manual specific to the procedure that you have chosen.  Please refer to that with any questions or call the office at [phone]

## 2022-12-15 NOTE — H&P
Bariatric Consult:  Referred by Lee Best MD    Jesika Angulo is here today for consult on Consult and Weight Loss (Sleeve consultation)      History of Present Illness:     Jesika Angulo is a 52 y.o. female with morbid obesity with co-morbidities including GERD who presents for surgical consultation for the above procedure. Jesika has completed the initial intake visit and has been examined by our nurse practitioner, dietician, psychologist and underwent the extensive educational teaching process under the guidance of our bariatric coordinator and myself. Jesika also has seen or if has not yet will see the educational video MARIO on the surgical procedure if available. Jesika attended today more educational teaching from our bariatric coordinator and myself. Jesika has had an extensive medical workup including a visit with their primary care physician, EKG, chest radiograph, blood work, EGD or UGI and possibly further testing. These have been reviewed by me and discussed with the patient. Jesika is now ready to proceed with surgery. Jesika presently denies nausea, vomiting, fever, chills, chest pain, shortness of air, melena, hematochezia, hemetemesis, dysuria, frequency, hematuria.       Past Medical History:   Diagnosis Date   • GERD (gastroesophageal reflux disease)    • Primary osteoarthritis of both knees    • Snoring        Encounter Diagnoses   Name Primary?   • Obesity, Class III, BMI 40-49.9 (morbid obesity) ([facility]) Yes   • Dietary counseling    • Gastroesophageal reflux disease, unspecified whether esophagitis present    • Hiatal hernia    • Chronic fatigue    • Chronic pain of both knees        Past Surgical History:   Procedure Laterality Date   • BREAST BIOPSY Left 2017   • ENDOSCOPY N/A 6/28/2022    Procedure: ESOPHAGOGASTRODUODENOSCOPY WITH BIOPSY;  Surgeon: Lm Recio Jr., MD;  Location: [facility] ENDOSCOPY;  Service: General;  Laterality: N/A;  PRE- GERD  POST- GASTRITIS   • TOE  SURGERY     • TUBAL ABDOMINAL LIGATION     • VENTRAL HERNIA REPAIR  1996    with mesh       Patient Active Problem List   Diagnosis   • Primary osteoarthritis of both knees   • Chronic fatigue   • Snoring   • Hiatal hernia   • Chronic pain of both knees   • GERD (gastroesophageal reflux disease)   • Obesity, Class III, BMI 40-49.9 (morbid obesity) ([facility])   • Dietary counseling       Allergies   Allergen Reactions   • Codeine GI Intolerance   • Hydrocodone GI Intolerance         Current Outpatient Medications:   •  acetaminophen (TYLENOL) 500 MG tablet, Take 500 mg by mouth Every 6 (Six) Hours As Needed., Disp: , Rfl:   •  multivitamin (THERAGRAN) tablet tablet, Take 1 tablet by mouth Daily., Disp: , Rfl:   •  omeprazole (priLOSEC) 40 MG capsule, Take 40 mg by mouth Daily., Disp: , Rfl:   •  Probiotic Product (PROBIOTIC ADVANCED PO), Take  by mouth., Disp: , Rfl:   •  brompheniramine-pseudoephedrine-DM 30-2-10 MG/5ML syrup, Take 5 mL by mouth 4 (Four) Times a Day As Needed for Congestion or Cough., Disp: 120 mL, Rfl: 0  •  Enoxaparin Sodium (LOVENOX) 40 MG/0.4ML solution prefilled syringe syringe, Inject 0.4 mL under the skin into the appropriate area as directed Every 12 (Twelve) Hours for 14 days. Start after surgery unless instructed otherwise, Disp: 11.2 mL, Rfl: 0  •  folic acid-vit B6-vit B12 (FOLBEE) 2.5-25-1 MG tablet tablet, Take 1 tablet by mouth Daily., Disp: 40 tablet, Rfl: 0  •  ursodiol (Actigall) 300 MG capsule, Take 1 capsule by mouth 2 (Two) Times a Day., Disp: 60 capsule, Rfl: 5    Social History     Socioeconomic History   • Marital status:    Tobacco Use   • Smoking status: Never   • Smokeless tobacco: Never   Vaping Use   • Vaping Use: Never used   Substance and Sexual Activity   • Alcohol use: Yes     Alcohol/week: 10.0 standard drinks     Types: 10 Standard drinks or equivalent per week     Comment: 5 times a week   • Drug use: No   • Sexual activity: Defer       Family History   Problem  Relation Age of Onset   • Hypertension Mother    • Obesity Mother    • Breast cancer Mother    • Hypertension Sister    • Hypertension Brother    • Breast cancer Maternal Grandmother    • Malig Hyperthermia Neg Hx        Review of Systems:  Review of Systems   Constitutional: Positive for fatigue.   Genitourinary: Positive for dysuria.   Musculoskeletal: Positive for arthralgias.   All other systems reviewed and are negative.        Physical Exam:    Vital Signs:      There is no height or weight on file to calculate BMI.  Temp: 97.3 °F (36.3 °C)   Heart Rate: 84   BP: 145/92       Physical Exam  Vitals reviewed.   HENT:      Head: Normocephalic and atraumatic.      Mouth/Throat:      Mouth: Mucous membranes are moist.      Pharynx: Oropharynx is clear.   Eyes:      General: No scleral icterus.     Extraocular Movements: Extraocular movements intact.      Conjunctiva/sclera: Conjunctivae normal.      Pupils: Pupils are equal, round, and reactive to light.   Neck:      Thyroid: No thyromegaly.   Cardiovascular:      Rate and Rhythm: Normal rate.   Pulmonary:      Effort: Pulmonary effort is normal. No respiratory distress.      Breath sounds: Normal breath sounds. No stridor. No wheezing or rhonchi.   Abdominal:      General: Bowel sounds are normal.      Palpations: Abdomen is soft.      Tenderness: There is no abdominal tenderness. There is no right CVA tenderness, left CVA tenderness, guarding or rebound.      Hernia: No hernia is present.   Musculoskeletal:         General: Normal range of motion.      Cervical back: Normal range of motion and neck supple.   Lymphadenopathy:      Cervical: No cervical adenopathy.   Skin:     General: Skin is warm and dry.      Findings: No erythema.   Neurological:      Mental Status: She is alert and oriented to person, place, and time.   Psychiatric:         Mood and Affect: Mood normal.         Behavior: Behavior normal.         Thought Content: Thought content normal.          Judgment: Judgment normal.           Assessment:    Jesika Angulo is a 52 y.o. year old female with medically complicated severe obesity with a BMI of There is no height or weight on file to calculate BMI. and multiple co-morbidities listed in the encounter diagnosis.    I think she is an appropriate candidate for this surgery, and is ready to proceed.      Plan/Discussion/Summary:  Small to moderate size hiatal hernia per me.  Patient does take PPI.  H. pylori negative.  Patient complains of dysuria and instructed patient to talk to her PCP today for further evaluation.    The patient has returned to the office for a surgical consultation and has requested to proceed with gastric sleeve.  I have had the opportunity to obtain a history, examine the patient and review the patient's chart. The procedure could be done laparoscopically and or robotically.    The patient understands that surgery is a tool and that weight loss is not guaranteed but only seen in the context of appropriate use, regular follow up, exercise and making appropriate food choices.     I personally discussed the potential complications of the laparoscopic gastric sleeve with this patient.  The patient is well aware of potential complications of the surgery that include but not limited to bleeding, infections, deep vein thrombosis, pulmonary embolism, pulmonary complications such as pneumonia, cardiac event, hernias, small bowel obstruction, damage to the spleen or other organs, bowel injury, disfiguring scars, failure to lose weight, need for additional surgery, conversion to an open procedure and death.  The patient is also aware of complications which apply in particular to the gastric sleeve and can include but not limited to the leakage of gastric contents at the staple line, the development of an intra-abdominal abscess, gastroesophageal reflux disease, Henning's esophagus, ulcers, vitamin/mineral deficiencies, strictures, and the  possibility of converting this procedure to a Sonja-en-Y gastric bypass. The patient also understands the possibility of requiring an acid reducer medication for the rest of their life.    The risks, benefits, potential complications and alternative therapies were discussed at great length as outlined in our extensive consent forms, online consent and educational teaching processes.    The patient has confirmed the participation in the programs extensive educational activities.    All questions and concerns were answered to patient's satisfaction.  The patient now wishes to proceed with surgery.     The patient has agreed to a postoperative course of anitcoagulant therapy.      I instructed patient that the surgery could be laparoscopically and/or robotically.    I instructed patient to start on a H2 blocker or proton pump inhibitor if not already on one of these medications.    I explained in detail the procedures that are in the consent.  All of these procedures have a chance to convert to open if any technical challenges or complications do occur.  Bariatric surgery is not cosmetic surgery but rather a tool to help a patient make a life-long commitment lifestyle change including diet, exercise, behavior changes, and taking supplemental vitamins and minerals.    Problems after surgery may require more operations to correct them.    The risks, benefits, alternatives, and potential complications of all of the procedures were explained in detail including, but not limited to death, anesthesia and medication adverse effect, deep venous thrombosis, pulmonary embolism, trocar site/incisional hernia, wound infection, abdominal infection, bleeding, failure to lose weight, gain weight, a change in body image, metabolic complications with vitamin deficiences and anemia.    Weight loss expectations were discussed with the patient in detail. The weight loss operations most commonly performed are the sleeve gastrectomy and the  Sonja-en-Y gastric bypass. These operations result in weight losses up to approximately 25-35% of initial body weight 12 to 24 months after surgery with the gastric bypass usually the higher percent of weight loss but depends on patient using the tool.    For the gastric bypass and loop duodenal switch (KELTON-S) the risks include but not limited to the following early complications:  Anastomotic leak/peritonitis, Sonja/Alimentary/biliopancreatic limb obstruction, severe & minor wound infection/seroma, and nausea/vomiting.  Late complications can include but are not limited to malnutrition, vitamin deficiencies, frequent loose stools,  stomal stenosis, marginal ulcer, bowel obstruction, intussusception, internal, and incisional hernia.    Regarding the gastric sleeve, there is higher risk of dysphagia and reflux leading to possible Henning's esophagus compared to a gastric bypass, as well as risk of internal visceral/organ injury, splenectomy, bleeding, infection, leak (which could require further intervention possible conversion to Sonja-en-Y gastric bypass), stenosis and possibility of regaining weight.    Jesika was counseled regarding diagnostic results, instructions for management, risk factor reductions, prognosis, patient and family education, impressions, risks and benefits of treatment options and importance of compliance with treatment. Total time of the encounter was over 45 minutes counseling the patient regarding the procedure as above and reviewing as well as ordering labs, medications and the procedure.  The chart was also reviewed prior to seeing the patient reviewing previous testing, studies and labs.    Jesika understands the surgical procedures and the different surgical options that are available.  She understands the lifestyle changes that are required after surgery and has agreed to follow the guidelines outlined in the weight management program.  She also expressed understanding of the risks  involved and had all of female questions answered and desires to proceed.      Lm Recio MD  12/15/2022

## 2022-12-20 ENCOUNTER — PRE-ADMISSION TESTING (OUTPATIENT)
Dept: PREADMISSION TESTING | Facility: HOSPITAL | Age: 52
End: 2022-12-20

## 2022-12-20 VITALS
DIASTOLIC BLOOD PRESSURE: 90 MMHG | SYSTOLIC BLOOD PRESSURE: 128 MMHG | OXYGEN SATURATION: 97 % | TEMPERATURE: 97.8 F | HEIGHT: 64 IN | RESPIRATION RATE: 16 BRPM | HEART RATE: 88 BPM | BODY MASS INDEX: 46.35 KG/M2

## 2022-12-20 DIAGNOSIS — E66.01 OBESITY, CLASS III, BMI 40-49.9 (MORBID OBESITY): ICD-10-CM

## 2022-12-20 LAB
ALBUMIN SERPL-MCNC: 4.3 G/DL (ref 3.5–5.2)
ALBUMIN/GLOB SERPL: 1.3 G/DL
ALP SERPL-CCNC: 79 U/L (ref 39–117)
ALT SERPL W P-5'-P-CCNC: 11 U/L (ref 1–33)
ANION GAP SERPL CALCULATED.3IONS-SCNC: 9 MMOL/L (ref 5–15)
AST SERPL-CCNC: 17 U/L (ref 1–32)
BILIRUB SERPL-MCNC: 0.3 MG/DL (ref 0–1.2)
BUN SERPL-MCNC: 21 MG/DL (ref 6–20)
BUN/CREAT SERPL: 25.9 (ref 7–25)
CALCIUM SPEC-SCNC: 9.3 MG/DL (ref 8.6–10.5)
CHLORIDE SERPL-SCNC: 102 MMOL/L (ref 98–107)
CO2 SERPL-SCNC: 26 MMOL/L (ref 22–29)
CREAT SERPL-MCNC: 0.81 MG/DL (ref 0.57–1)
DEPRECATED RDW RBC AUTO: 39 FL (ref 37–54)
EGFRCR SERPLBLD CKD-EPI 2021: 88 ML/MIN/1.73
ERYTHROCYTE [DISTWIDTH] IN BLOOD BY AUTOMATED COUNT: 12.4 % (ref 12.3–15.4)
GLOBULIN UR ELPH-MCNC: 3.3 GM/DL
GLUCOSE SERPL-MCNC: 84 MG/DL (ref 65–99)
HCT VFR BLD AUTO: 33.8 % (ref 34–46.6)
HGB BLD-MCNC: 11.6 G/DL (ref 12–15.9)
MCH RBC QN AUTO: 29.3 PG (ref 26.6–33)
MCHC RBC AUTO-ENTMCNC: 34.3 G/DL (ref 31.5–35.7)
MCV RBC AUTO: 85.4 FL (ref 79–97)
PLATELET # BLD AUTO: 349 10*3/MM3 (ref 140–450)
PMV BLD AUTO: 8.8 FL (ref 6–12)
POTASSIUM SERPL-SCNC: 3.8 MMOL/L (ref 3.5–5.2)
PROT SERPL-MCNC: 7.6 G/DL (ref 6–8.5)
RBC # BLD AUTO: 3.96 10*6/MM3 (ref 3.77–5.28)
SODIUM SERPL-SCNC: 137 MMOL/L (ref 136–145)
WBC NRBC COR # BLD: 7.98 10*3/MM3 (ref 3.4–10.8)

## 2022-12-20 PROCEDURE — 80053 COMPREHEN METABOLIC PANEL: CPT

## 2022-12-20 PROCEDURE — 85027 COMPLETE CBC AUTOMATED: CPT

## 2022-12-20 PROCEDURE — 36415 COLL VENOUS BLD VENIPUNCTURE: CPT

## 2022-12-20 NOTE — DISCHARGE INSTRUCTIONS
Take only the following medications the morning of surgery:     NO MEDS AM OF SURGERY    TIME OF ARRIVAL 7:30      Do not take Bariatric Vitamins, Folic Acid, Actigall (if applicable) or Lovenox Injections (if applicable) the morning of surgery.  If you have a history of blood clots or have a BMI greater than 50, Dr. Recio may order Lovenox for after surgery. Do not take Lovenox blood thinner before surgery.      General Instructions:    Liquids only the day before surgery.  Drink one 20 ounce Gatorade G2 the evening before surgery.  Nothing red in color.   The morning of surgery have another 20 ounce Gatorade G2.  Again, nothing red in color.  Your drink must be completed 2 hours before your arrival time.   Patients who avoid smoking, chewing tobacco and alcohol for 4 weeks prior to surgery have a reduced risk of post-operative complications.  Quit smoking as many days before surgery as you can.  Do not smoke, use chewing tobacco or drink alcohol the day of surgery.   Bring any papers given to you in the doctor's office.  Wear clean comfortable clothes.  Do not wear contact lenses, false eyelashes or make-up.  Bring a case for your glasses.   Bring crutches or walker if applicable.  Remove all piercings.  Leave jewelry and any other valuables at home.  Remove fingernail polish, gel overlays or any artificial nails.  Hair extensions with metal clips must be removed prior to surgery.  The Pre-Admission Testing nurse will instruct you to bring medications if unable to obtain an accurate list in Pre-Admission Testing.      Preventing a Surgical Site Infection:  For 2 to 3 days before surgery, avoid shaving with a razor because the razor can irritate skin and make it easier to develop an infection.    Any areas of open skin can increase the risk of a post-operative wound infection by allowing bacteria to enter and travel throughout the body.  Notify your surgeon if you have any skin wounds / rashes even if it is not  near the expected surgical site.  The area will need assessed to determine if surgery should be delayed until it is healed.  2 days prior to surgery, take a shower using a fresh bar of anti-bacterial soap (such as Dial).  Use a clean washcloth and dry with a clean towel.    The day prior to surgery, take a shower using a fresh bar of anti-bacterial soap (such as Dial).  Use a clean washcloth and dry with a clean towel.  Sleep in a clean bed with clean clothing.  Do not allow pets to sleep with you.  The morning of surgery shower using a fresh bar of anti-bacterial soap (such as Dial).  Use a clean washcloth and dry with a clean towel.  Follow the Chlorhexidine instructions below.    CHLORHEXIDINE CLOTH INSTRUCTIONS  The morning of surgery follow these instructions using the Chlorhexidine cloths you've been given.  These steps reduce bacteria on the body.  Do not use the cloths near your eyes, ears mouth, genitalia or on open wounds.  Throw the cloths away after use but do not try to flush them down a toilet.    Open and remove one cloth at a time from the package.    Leave the cloth unfolded and begin the bathing.  Massage the skin with the cloths using gentle pressure to remove bacteria.  Do not scrub harshly.   Follow the steps below with one 2% CHG cloth per area (6 total cloths).  One cloth for neck, shoulders and chest.  One cloth for both arms, hands, fingers and underarms (do underarms last).  One cloth for the abdomen followed by groin.  One cloth for right leg and foot including between the toes.  One cloth for left leg and foot including between the toes.  The last cloth is to be used for the back of the neck, back and buttocks.    Allow the CHG to air dry 3 minutes on the skin which will give it time to work and decrease the chance of irritation.  The skin may feel sticky until it is dry.  Do not rinse with water or any other liquid or you will lose the beneficial effects of the CHG.  If mild skin  irritation occurs, do rinse the skin to remove the CHG.  Report this to the nurse at time of admission.  Do not apply lotions, creams, ointments, deodorants or perfumes after using the clothes. Dress in clean clothes before coming to the hospital.    Ask your surgeon if you will be receiving antibiotics prior to surgery.  Make sure you, your family, and all healthcare providers clean their hands with soap and water or an alcohol based hand  before caring for you or your wound.      Day of surgery:  Your arrival time is approximately two hours before your scheduled surgery time.  Upon arrival, a Pre-op nurse and Anesthesiologist will review your health history, obtain vital signs, and answer questions you may have.  A Pre-op nurse will start an IV and you may receive medication in preparation for surgery, including something to help you relax.  If applicable, we do ask that you have your C-PAP/BI-PAP machine available. It can be utilized the night of surgery.     Please be aware that surgery does come with discomfort.  We want to make every effort to control your discomfort so please discuss any uncontrolled symptoms with your nurse.   Your doctor will most likely have prescribed pain medications.      If you are going home after surgery you will receive individualized written care instructions before being discharged.  A responsible adult must drive you to and from the hospital on the day of your surgery and stay with you for 24 hours.  Discharge prescriptions can be filled by the hospital pharmacy during regular pharmacy hours.  If you are having surgery late in the day/evening your prescription may be e-prescribed to your pharmacy.  Please verify your pharmacy hours or chose a 24 hour pharmacy to avoid not having access to your prescription because your pharmacy has closed for the day.    If you are staying overnight following surgery, you will be transported to your hospital room following the recovery  period.  [facility] has all private rooms.    If you have any questions please call Pre-Admission Testing at [phone].  Deductibles and co-payments are collected on the day of service. Please be prepared to pay the required co-pay, deductible or deposit on the day of service as defined by your plan.    Call your surgeon immediately if you experience any of the following symptoms:  Sore Throat  Shortness of Breath or difficulty breathing  Cough  Chills  Body soreness or muscle pain  Headache  Fever  New loss of taste or smell  Do not arrive for your surgery ill.  Your procedure will need to be rescheduled to another time.  You will need to call your physician before the day of surgery to avoid any unnecessary exposure to hospital staff as well as other patients.

## 2022-12-27 RX ORDER — ACETAMINOPHEN 10 MG/ML
1000 INJECTION, SOLUTION INTRAVENOUS ONCE
Status: CANCELLED | OUTPATIENT
Start: 2022-12-28

## 2022-12-28 ENCOUNTER — ANESTHESIA EVENT (OUTPATIENT)
Dept: PERIOP | Facility: HOSPITAL | Age: 52
DRG: 621 | End: 2022-12-28
Payer: COMMERCIAL

## 2022-12-28 ENCOUNTER — ANESTHESIA (OUTPATIENT)
Dept: PERIOP | Facility: HOSPITAL | Age: 52
DRG: 621 | End: 2022-12-28
Payer: COMMERCIAL

## 2022-12-28 ENCOUNTER — HOSPITAL ENCOUNTER (INPATIENT)
Facility: HOSPITAL | Age: 52
LOS: 1 days | Discharge: HOME | DRG: 621 | End: 2022-12-29
Attending: SURGERY | Admitting: SURGERY
Payer: COMMERCIAL

## 2022-12-28 DIAGNOSIS — E66.01 OBESITY, CLASS III, BMI 40-49.9 (MORBID OBESITY): ICD-10-CM

## 2022-12-28 PROCEDURE — 25010000002 PROPOFOL 10 MG/ML EMULSION: Performed by: NURSE ANESTHETIST, CERTIFIED REGISTERED

## 2022-12-28 PROCEDURE — 43775 LAP SLEEVE GASTRECTOMY: CPT | Performed by: SURGERY

## 2022-12-28 PROCEDURE — 25010000002 HYDROMORPHONE PER 4 MG: Performed by: SURGERY

## 2022-12-28 PROCEDURE — 25010000002 METOCLOPRAMIDE PER 10 MG: Performed by: SURGERY

## 2022-12-28 PROCEDURE — 25010000002 MIDAZOLAM PER 1 MG: Performed by: ANESTHESIOLOGY

## 2022-12-28 PROCEDURE — 25010000002 CLONIDINE PER 1 MG: Performed by: SURGERY

## 2022-12-28 PROCEDURE — 25010000002 ONDANSETRON PER 1 MG: Performed by: NURSE ANESTHETIST, CERTIFIED REGISTERED

## 2022-12-28 PROCEDURE — 25010000002 KETOROLAC TROMETHAMINE PER 15 MG: Performed by: SURGERY

## 2022-12-28 PROCEDURE — 25010000002 ONDANSETRON PER 1 MG: Performed by: SURGERY

## 2022-12-28 PROCEDURE — 25010000002 DEXAMETHASONE SODIUM PHOSPHATE 20 MG/5ML SOLUTION: Performed by: NURSE ANESTHETIST, CERTIFIED REGISTERED

## 2022-12-28 PROCEDURE — 25010000002 FENTANYL CITRATE (PF) 50 MCG/ML SOLUTION: Performed by: NURSE ANESTHETIST, CERTIFIED REGISTERED

## 2022-12-28 PROCEDURE — 25010000002 ROPIVACAINE PER 1 MG: Performed by: SURGERY

## 2022-12-28 PROCEDURE — 25010000002 FENTANYL CITRATE (PF) 100 MCG/2ML SOLUTION: Performed by: NURSE ANESTHETIST, CERTIFIED REGISTERED

## 2022-12-28 PROCEDURE — 25010000002 MAGNESIUM SULFATE PER 500 MG OF MAGNESIUM: Performed by: NURSE ANESTHETIST, CERTIFIED REGISTERED

## 2022-12-28 PROCEDURE — 0BQT4ZZ REPAIR DIAPHRAGM, PERCUTANEOUS ENDOSCOPIC APPROACH: ICD-10-PCS | Performed by: SURGERY

## 2022-12-28 PROCEDURE — 43775 LAP SLEEVE GASTRECTOMY: CPT | Performed by: NURSE PRACTITIONER

## 2022-12-28 PROCEDURE — 25010000002 EPINEPHRINE 1 MG/ML SOLUTION 30 ML VIAL: Performed by: SURGERY

## 2022-12-28 PROCEDURE — 0DB64Z3 EXCISION OF STOMACH, PERCUTANEOUS ENDOSCOPIC APPROACH, VERTICAL: ICD-10-PCS | Performed by: SURGERY

## 2022-12-28 PROCEDURE — 88307 TISSUE EXAM BY PATHOLOGIST: CPT | Performed by: SURGERY

## 2022-12-28 PROCEDURE — 25010000002 CEFAZOLIN PER 500 MG: Performed by: SURGERY

## 2022-12-28 DEVICE — IMPLANTABLE DEVICE
Type: IMPLANTABLE DEVICE | Site: STOMACH | Status: FUNCTIONAL
Brand: TITAN SGS STANDARD GASTRIC STAPLER

## 2022-12-28 RX ORDER — PROMETHAZINE HYDROCHLORIDE 25 MG/1
12.5 TABLET ORAL ONCE AS NEEDED
Status: DISCONTINUED | OUTPATIENT
Start: 2022-12-28 | End: 2022-12-28 | Stop reason: HOSPADM

## 2022-12-28 RX ORDER — SODIUM CHLORIDE, SODIUM LACTATE, POTASSIUM CHLORIDE, CALCIUM CHLORIDE 600; 310; 30; 20 MG/100ML; MG/100ML; MG/100ML; MG/100ML
100 INJECTION, SOLUTION INTRAVENOUS CONTINUOUS
Status: DISCONTINUED | OUTPATIENT
Start: 2022-12-28 | End: 2022-12-28

## 2022-12-28 RX ORDER — LIDOCAINE HYDROCHLORIDE 10 MG/ML
0.5 INJECTION, SOLUTION EPIDURAL; INFILTRATION; INTRACAUDAL; PERINEURAL ONCE AS NEEDED
Status: DISCONTINUED | OUTPATIENT
Start: 2022-12-28 | End: 2022-12-28

## 2022-12-28 RX ORDER — DIPHENHYDRAMINE HYDROCHLORIDE 50 MG/ML
25 INJECTION INTRAMUSCULAR; INTRAVENOUS EVERY 4 HOURS PRN
Status: DISCONTINUED | OUTPATIENT
Start: 2022-12-28 | End: 2022-12-29 | Stop reason: HOSPADM

## 2022-12-28 RX ORDER — PROMETHAZINE HYDROCHLORIDE 12.5 MG/1
12.5 TABLET ORAL EVERY 4 HOURS PRN
Status: DISCONTINUED | OUTPATIENT
Start: 2022-12-28 | End: 2022-12-29 | Stop reason: HOSPADM

## 2022-12-28 RX ORDER — CEFAZOLIN SODIUM IN 0.9 % NACL 3 G/100 ML
3 INTRAVENOUS SOLUTION, PIGGYBACK (ML) INTRAVENOUS ONCE
Status: COMPLETED | OUTPATIENT
Start: 2022-12-28 | End: 2022-12-28

## 2022-12-28 RX ORDER — DIPHENHYDRAMINE HCL 25 MG
25 CAPSULE ORAL
Status: DISCONTINUED | OUTPATIENT
Start: 2022-12-28 | End: 2022-12-28 | Stop reason: HOSPADM

## 2022-12-28 RX ORDER — DEXAMETHASONE SODIUM PHOSPHATE 4 MG/ML
INJECTION, SOLUTION INTRA-ARTICULAR; INTRALESIONAL; INTRAMUSCULAR; INTRAVENOUS; SOFT TISSUE AS NEEDED
Status: DISCONTINUED | OUTPATIENT
Start: 2022-12-28 | End: 2022-12-28 | Stop reason: SURG

## 2022-12-28 RX ORDER — MAGNESIUM SULFATE HEPTAHYDRATE 500 MG/ML
INJECTION, SOLUTION INTRAMUSCULAR; INTRAVENOUS AS NEEDED
Status: DISCONTINUED | OUTPATIENT
Start: 2022-12-28 | End: 2022-12-28 | Stop reason: SURG

## 2022-12-28 RX ORDER — ACETAMINOPHEN 160 MG/5ML
975 SOLUTION ORAL EVERY 6 HOURS
Status: DISCONTINUED | OUTPATIENT
Start: 2022-12-28 | End: 2022-12-29 | Stop reason: HOSPADM

## 2022-12-28 RX ORDER — SODIUM CHLORIDE, SODIUM LACTATE, POTASSIUM CHLORIDE, CALCIUM CHLORIDE 600; 310; 30; 20 MG/100ML; MG/100ML; MG/100ML; MG/100ML
150 INJECTION, SOLUTION INTRAVENOUS CONTINUOUS
Status: DISCONTINUED | OUTPATIENT
Start: 2022-12-28 | End: 2022-12-29 | Stop reason: HOSPADM

## 2022-12-28 RX ORDER — HYDROCODONE BITARTRATE AND ACETAMINOPHEN 7.5; 325 MG/1; MG/1
1 TABLET ORAL ONCE AS NEEDED
Status: DISCONTINUED | OUTPATIENT
Start: 2022-12-28 | End: 2022-12-28 | Stop reason: HOSPADM

## 2022-12-28 RX ORDER — MIDAZOLAM HYDROCHLORIDE 1 MG/ML
1 INJECTION INTRAMUSCULAR; INTRAVENOUS
Status: DISCONTINUED | OUTPATIENT
Start: 2022-12-28 | End: 2022-12-28

## 2022-12-28 RX ORDER — GABAPENTIN 300 MG/1
300 CAPSULE ORAL EVERY 8 HOURS
Status: DISCONTINUED | OUTPATIENT
Start: 2022-12-28 | End: 2022-12-29 | Stop reason: HOSPADM

## 2022-12-28 RX ORDER — ACETAMINOPHEN 500 MG
1000 TABLET ORAL EVERY 6 HOURS
Status: DISCONTINUED | OUTPATIENT
Start: 2022-12-28 | End: 2022-12-29 | Stop reason: HOSPADM

## 2022-12-28 RX ORDER — HYDROMORPHONE HYDROCHLORIDE 1 MG/ML
0.5 INJECTION, SOLUTION INTRAMUSCULAR; INTRAVENOUS; SUBCUTANEOUS
Status: DISCONTINUED | OUTPATIENT
Start: 2022-12-28 | End: 2022-12-28 | Stop reason: HOSPADM

## 2022-12-28 RX ORDER — HYDRALAZINE HYDROCHLORIDE 20 MG/ML
5 INJECTION INTRAMUSCULAR; INTRAVENOUS
Status: DISCONTINUED | OUTPATIENT
Start: 2022-12-28 | End: 2022-12-28 | Stop reason: HOSPADM

## 2022-12-28 RX ORDER — CYANOCOBALAMIN 1000 UG/ML
1000 INJECTION, SOLUTION INTRAMUSCULAR; SUBCUTANEOUS ONCE
Status: COMPLETED | OUTPATIENT
Start: 2022-12-29 | End: 2022-12-29

## 2022-12-28 RX ORDER — PROCHLORPERAZINE EDISYLATE 5 MG/ML
10 INJECTION INTRAMUSCULAR; INTRAVENOUS EVERY 6 HOURS PRN
Status: DISCONTINUED | OUTPATIENT
Start: 2022-12-28 | End: 2022-12-29 | Stop reason: HOSPADM

## 2022-12-28 RX ORDER — ALBUTEROL SULFATE 2.5 MG/3ML
2.5 SOLUTION RESPIRATORY (INHALATION) EVERY 4 HOURS PRN
Status: DISCONTINUED | OUTPATIENT
Start: 2022-12-28 | End: 2022-12-29 | Stop reason: HOSPADM

## 2022-12-28 RX ORDER — SODIUM CHLORIDE 9 MG/ML
INJECTION, SOLUTION INTRAVENOUS AS NEEDED
Status: DISCONTINUED | OUTPATIENT
Start: 2022-12-28 | End: 2022-12-28 | Stop reason: HOSPADM

## 2022-12-28 RX ORDER — ONDANSETRON 4 MG/1
4 TABLET, FILM COATED ORAL EVERY 4 HOURS PRN
Status: DISCONTINUED | OUTPATIENT
Start: 2022-12-28 | End: 2022-12-29 | Stop reason: HOSPADM

## 2022-12-28 RX ORDER — FLUMAZENIL 0.1 MG/ML
0.2 INJECTION INTRAVENOUS AS NEEDED
Status: DISCONTINUED | OUTPATIENT
Start: 2022-12-28 | End: 2022-12-28 | Stop reason: HOSPADM

## 2022-12-28 RX ORDER — SODIUM CHLORIDE, SODIUM LACTATE, POTASSIUM CHLORIDE, CALCIUM CHLORIDE 600; 310; 30; 20 MG/100ML; MG/100ML; MG/100ML; MG/100ML
9 INJECTION, SOLUTION INTRAVENOUS CONTINUOUS PRN
Status: DISCONTINUED | OUTPATIENT
Start: 2022-12-28 | End: 2022-12-28

## 2022-12-28 RX ORDER — KETAMINE HCL IN NACL, ISO-OSM 100MG/10ML
SYRINGE (ML) INJECTION AS NEEDED
Status: DISCONTINUED | OUTPATIENT
Start: 2022-12-28 | End: 2022-12-28 | Stop reason: SURG

## 2022-12-28 RX ORDER — SODIUM CHLORIDE 0.9 % (FLUSH) 0.9 %
3-10 SYRINGE (ML) INJECTION AS NEEDED
Status: DISCONTINUED | OUTPATIENT
Start: 2022-12-28 | End: 2022-12-28 | Stop reason: HOSPADM

## 2022-12-28 RX ORDER — CHLORHEXIDINE GLUCONATE 0.12 MG/ML
15 RINSE ORAL SEE ADMIN INSTRUCTIONS
Status: COMPLETED | OUTPATIENT
Start: 2022-12-28 | End: 2022-12-28

## 2022-12-28 RX ORDER — SODIUM CHLORIDE 9 MG/ML
40 INJECTION, SOLUTION INTRAVENOUS AS NEEDED
Status: DISCONTINUED | OUTPATIENT
Start: 2022-12-28 | End: 2022-12-28 | Stop reason: HOSPADM

## 2022-12-28 RX ORDER — SODIUM CHLORIDE 0.9 % (FLUSH) 0.9 %
10 SYRINGE (ML) INJECTION AS NEEDED
Status: DISCONTINUED | OUTPATIENT
Start: 2022-12-28 | End: 2022-12-28

## 2022-12-28 RX ORDER — PANTOPRAZOLE SODIUM 40 MG/10ML
40 INJECTION, POWDER, LYOPHILIZED, FOR SOLUTION INTRAVENOUS ONCE
Status: COMPLETED | OUTPATIENT
Start: 2022-12-28 | End: 2022-12-28

## 2022-12-28 RX ORDER — HYDROMORPHONE HYDROCHLORIDE 1 MG/ML
0.5 INJECTION, SOLUTION INTRAMUSCULAR; INTRAVENOUS; SUBCUTANEOUS
Status: DISCONTINUED | OUTPATIENT
Start: 2022-12-28 | End: 2022-12-29 | Stop reason: HOSPADM

## 2022-12-28 RX ORDER — GABAPENTIN 250 MG/5ML
300 SOLUTION ORAL ONCE
Status: COMPLETED | OUTPATIENT
Start: 2022-12-28 | End: 2022-12-28

## 2022-12-28 RX ORDER — OXYCODONE AND ACETAMINOPHEN 7.5; 325 MG/1; MG/1
1 TABLET ORAL EVERY 4 HOURS PRN
Status: DISCONTINUED | OUTPATIENT
Start: 2022-12-28 | End: 2022-12-28 | Stop reason: HOSPADM

## 2022-12-28 RX ORDER — PROPOFOL 10 MG/ML
VIAL (ML) INTRAVENOUS AS NEEDED
Status: DISCONTINUED | OUTPATIENT
Start: 2022-12-28 | End: 2022-12-28 | Stop reason: SURG

## 2022-12-28 RX ORDER — PROMETHAZINE HYDROCHLORIDE 25 MG/1
25 TABLET ORAL ONCE AS NEEDED
Status: DISCONTINUED | OUTPATIENT
Start: 2022-12-28 | End: 2022-12-28 | Stop reason: HOSPADM

## 2022-12-28 RX ORDER — FENTANYL CITRATE 50 UG/ML
50 INJECTION, SOLUTION INTRAMUSCULAR; INTRAVENOUS
Status: DISCONTINUED | OUTPATIENT
Start: 2022-12-28 | End: 2022-12-28 | Stop reason: HOSPADM

## 2022-12-28 RX ORDER — ENOXAPARIN SODIUM 100 MG/ML
40 INJECTION SUBCUTANEOUS ONCE
Status: COMPLETED | OUTPATIENT
Start: 2022-12-29 | End: 2022-12-29

## 2022-12-28 RX ORDER — MAGNESIUM HYDROXIDE 1200 MG/15ML
LIQUID ORAL AS NEEDED
Status: DISCONTINUED | OUTPATIENT
Start: 2022-12-28 | End: 2022-12-28 | Stop reason: HOSPADM

## 2022-12-28 RX ORDER — METOCLOPRAMIDE HYDROCHLORIDE 5 MG/ML
10 INJECTION INTRAMUSCULAR; INTRAVENOUS EVERY 6 HOURS
Status: DISCONTINUED | OUTPATIENT
Start: 2022-12-28 | End: 2022-12-29 | Stop reason: HOSPADM

## 2022-12-28 RX ORDER — ONDANSETRON 2 MG/ML
4 INJECTION INTRAMUSCULAR; INTRAVENOUS EVERY 4 HOURS PRN
Status: DISCONTINUED | OUTPATIENT
Start: 2022-12-28 | End: 2022-12-29 | Stop reason: HOSPADM

## 2022-12-28 RX ORDER — NALOXONE HCL 0.4 MG/ML
0.2 VIAL (ML) INJECTION AS NEEDED
Status: DISCONTINUED | OUTPATIENT
Start: 2022-12-28 | End: 2022-12-28 | Stop reason: HOSPADM

## 2022-12-28 RX ORDER — ONDANSETRON 2 MG/ML
4 INJECTION INTRAMUSCULAR; INTRAVENOUS ONCE AS NEEDED
Status: DISCONTINUED | OUTPATIENT
Start: 2022-12-28 | End: 2022-12-28 | Stop reason: HOSPADM

## 2022-12-28 RX ORDER — SODIUM CHLORIDE 0.9 % (FLUSH) 0.9 %
3 SYRINGE (ML) INJECTION EVERY 12 HOURS SCHEDULED
Status: DISCONTINUED | OUTPATIENT
Start: 2022-12-28 | End: 2022-12-29 | Stop reason: HOSPADM

## 2022-12-28 RX ORDER — NALOXONE HCL 0.4 MG/ML
0.1 VIAL (ML) INJECTION
Status: DISCONTINUED | OUTPATIENT
Start: 2022-12-28 | End: 2022-12-29 | Stop reason: HOSPADM

## 2022-12-28 RX ORDER — SCOLOPAMINE TRANSDERMAL SYSTEM 1 MG/1
1 PATCH, EXTENDED RELEASE TRANSDERMAL CONTINUOUS
Status: DISCONTINUED | OUTPATIENT
Start: 2022-12-28 | End: 2022-12-29 | Stop reason: HOSPADM

## 2022-12-28 RX ORDER — GABAPENTIN 250 MG/5ML
300 SOLUTION ORAL EVERY 8 HOURS
Status: DISCONTINUED | OUTPATIENT
Start: 2022-12-28 | End: 2022-12-29 | Stop reason: HOSPADM

## 2022-12-28 RX ORDER — ONDANSETRON 2 MG/ML
INJECTION INTRAMUSCULAR; INTRAVENOUS AS NEEDED
Status: DISCONTINUED | OUTPATIENT
Start: 2022-12-28 | End: 2022-12-28 | Stop reason: SURG

## 2022-12-28 RX ORDER — EPHEDRINE SULFATE 50 MG/ML
5 INJECTION, SOLUTION INTRAVENOUS ONCE AS NEEDED
Status: DISCONTINUED | OUTPATIENT
Start: 2022-12-28 | End: 2022-12-28 | Stop reason: HOSPADM

## 2022-12-28 RX ORDER — PROMETHAZINE HYDROCHLORIDE 12.5 MG/1
12.5 SUPPOSITORY RECTAL EVERY 4 HOURS PRN
Status: DISCONTINUED | OUTPATIENT
Start: 2022-12-28 | End: 2022-12-29 | Stop reason: HOSPADM

## 2022-12-28 RX ORDER — HYDROMORPHONE HYDROCHLORIDE 2 MG/1
2 TABLET ORAL EVERY 4 HOURS PRN
Status: DISCONTINUED | OUTPATIENT
Start: 2022-12-28 | End: 2022-12-29 | Stop reason: HOSPADM

## 2022-12-28 RX ORDER — LIDOCAINE HYDROCHLORIDE 20 MG/ML
INJECTION, SOLUTION INFILTRATION; PERINEURAL AS NEEDED
Status: DISCONTINUED | OUTPATIENT
Start: 2022-12-28 | End: 2022-12-28 | Stop reason: SURG

## 2022-12-28 RX ORDER — PROMETHAZINE HYDROCHLORIDE 25 MG/1
25 SUPPOSITORY RECTAL ONCE AS NEEDED
Status: DISCONTINUED | OUTPATIENT
Start: 2022-12-28 | End: 2022-12-28 | Stop reason: HOSPADM

## 2022-12-28 RX ORDER — GLYCOPYRROLATE 0.2 MG/ML
0.2 INJECTION INTRAMUSCULAR; INTRAVENOUS
Status: COMPLETED | OUTPATIENT
Start: 2022-12-28 | End: 2022-12-28

## 2022-12-28 RX ORDER — FENTANYL CITRATE 50 UG/ML
INJECTION, SOLUTION INTRAMUSCULAR; INTRAVENOUS AS NEEDED
Status: DISCONTINUED | OUTPATIENT
Start: 2022-12-28 | End: 2022-12-28 | Stop reason: SURG

## 2022-12-28 RX ORDER — SODIUM CHLORIDE 0.9 % (FLUSH) 0.9 %
10 SYRINGE (ML) INJECTION EVERY 12 HOURS SCHEDULED
Status: DISCONTINUED | OUTPATIENT
Start: 2022-12-28 | End: 2022-12-28

## 2022-12-28 RX ORDER — LORAZEPAM 1 MG/1
1 TABLET ORAL EVERY 12 HOURS PRN
Status: DISCONTINUED | OUTPATIENT
Start: 2022-12-28 | End: 2022-12-29 | Stop reason: HOSPADM

## 2022-12-28 RX ORDER — SODIUM CHLORIDE 9 MG/ML
40 INJECTION, SOLUTION INTRAVENOUS AS NEEDED
Status: DISCONTINUED | OUTPATIENT
Start: 2022-12-28 | End: 2022-12-28

## 2022-12-28 RX ORDER — METOCLOPRAMIDE HYDROCHLORIDE 5 MG/ML
10 INJECTION INTRAMUSCULAR; INTRAVENOUS ONCE
Status: COMPLETED | OUTPATIENT
Start: 2022-12-28 | End: 2022-12-28

## 2022-12-28 RX ORDER — HYDRALAZINE HYDROCHLORIDE 20 MG/ML
10 INJECTION INTRAMUSCULAR; INTRAVENOUS
Status: DISCONTINUED | OUTPATIENT
Start: 2022-12-28 | End: 2022-12-29 | Stop reason: HOSPADM

## 2022-12-28 RX ORDER — LABETALOL HYDROCHLORIDE 5 MG/ML
5 INJECTION, SOLUTION INTRAVENOUS
Status: DISCONTINUED | OUTPATIENT
Start: 2022-12-28 | End: 2022-12-28 | Stop reason: HOSPADM

## 2022-12-28 RX ORDER — ACETAMINOPHEN 10 MG/ML
INJECTION, SOLUTION INTRAVENOUS AS NEEDED
Status: DISCONTINUED | OUTPATIENT
Start: 2022-12-28 | End: 2022-12-28 | Stop reason: SURG

## 2022-12-28 RX ORDER — ROCURONIUM BROMIDE 10 MG/ML
INJECTION, SOLUTION INTRAVENOUS AS NEEDED
Status: DISCONTINUED | OUTPATIENT
Start: 2022-12-28 | End: 2022-12-28 | Stop reason: SURG

## 2022-12-28 RX ORDER — DIPHENHYDRAMINE HYDROCHLORIDE 50 MG/ML
12.5 INJECTION INTRAMUSCULAR; INTRAVENOUS
Status: DISCONTINUED | OUTPATIENT
Start: 2022-12-28 | End: 2022-12-28 | Stop reason: HOSPADM

## 2022-12-28 RX ORDER — MIRTAZAPINE 15 MG/1
15 TABLET, ORALLY DISINTEGRATING ORAL NIGHTLY
Status: DISCONTINUED | OUTPATIENT
Start: 2022-12-28 | End: 2022-12-29 | Stop reason: HOSPADM

## 2022-12-28 RX ORDER — FAMOTIDINE 10 MG/ML
20 INJECTION, SOLUTION INTRAVENOUS EVERY 12 HOURS SCHEDULED
Status: DISCONTINUED | OUTPATIENT
Start: 2022-12-28 | End: 2022-12-29 | Stop reason: HOSPADM

## 2022-12-28 RX ORDER — ONDANSETRON 4 MG/1
4 TABLET, ORALLY DISINTEGRATING ORAL EVERY 4 HOURS PRN
Status: DISCONTINUED | OUTPATIENT
Start: 2022-12-28 | End: 2022-12-29 | Stop reason: HOSPADM

## 2022-12-28 RX ADMIN — Medication 25 MG: at 13:43

## 2022-12-28 RX ADMIN — ACETAMINOPHEN 1000 MG: 500 TABLET, FILM COATED ORAL at 21:35

## 2022-12-28 RX ADMIN — SODIUM CHLORIDE, POTASSIUM CHLORIDE, SODIUM LACTATE AND CALCIUM CHLORIDE: 600; 310; 30; 20 INJECTION, SOLUTION INTRAVENOUS at 13:42

## 2022-12-28 RX ADMIN — PROPOFOL 100 MCG/KG/MIN: 10 INJECTION, EMULSION INTRAVENOUS at 13:42

## 2022-12-28 RX ADMIN — MIRTAZAPINE 15 MG: 15 TABLET, ORALLY DISINTEGRATING ORAL at 21:35

## 2022-12-28 RX ADMIN — SCOPALAMINE 1 PATCH: 1 PATCH, EXTENDED RELEASE TRANSDERMAL at 08:25

## 2022-12-28 RX ADMIN — DEXAMETHASONE SODIUM PHOSPHATE 12 MG: 4 INJECTION, SOLUTION INTRAMUSCULAR; INTRAVENOUS at 13:50

## 2022-12-28 RX ADMIN — HYDROMORPHONE HYDROCHLORIDE 0.5 MG: 1 INJECTION, SOLUTION INTRAMUSCULAR; INTRAVENOUS; SUBCUTANEOUS at 21:40

## 2022-12-28 RX ADMIN — ACETAMINOPHEN 1000 MG: 10 INJECTION, SOLUTION INTRAVENOUS at 14:09

## 2022-12-28 RX ADMIN — GABAPENTIN 300 MG: 250 SOLUTION ORAL at 08:23

## 2022-12-28 RX ADMIN — MAGNESIUM SULFATE HEPTAHYDRATE 2 G: 500 INJECTION, SOLUTION INTRAMUSCULAR; INTRAVENOUS at 13:51

## 2022-12-28 RX ADMIN — SODIUM CHLORIDE, POTASSIUM CHLORIDE, SODIUM LACTATE AND CALCIUM CHLORIDE 150 ML/HR: 600; 310; 30; 20 INJECTION, SOLUTION INTRAVENOUS at 16:53

## 2022-12-28 RX ADMIN — HYOSCYAMINE SULFATE 125 MCG: 0.12 TABLET, ORALLY DISINTEGRATING ORAL at 21:39

## 2022-12-28 RX ADMIN — ONDANSETRON 4 MG: 2 INJECTION INTRAMUSCULAR; INTRAVENOUS at 14:02

## 2022-12-28 RX ADMIN — SODIUM CHLORIDE 3 G: 9 INJECTION, SOLUTION INTRAVENOUS at 13:25

## 2022-12-28 RX ADMIN — FAMOTIDINE 20 MG: 10 INJECTION INTRAVENOUS at 21:35

## 2022-12-28 RX ADMIN — PROPOFOL 200 MG: 10 INJECTION, EMULSION INTRAVENOUS at 13:38

## 2022-12-28 RX ADMIN — SODIUM CHLORIDE, POTASSIUM CHLORIDE, SODIUM LACTATE AND CALCIUM CHLORIDE 500 ML: 600; 310; 30; 20 INJECTION, SOLUTION INTRAVENOUS at 08:00

## 2022-12-28 RX ADMIN — ROCURONIUM BROMIDE 50 MG: 10 INJECTION, SOLUTION INTRAVENOUS at 13:38

## 2022-12-28 RX ADMIN — CHLORHEXIDINE GLUCONATE 15 ML: 1.2 SOLUTION ORAL at 08:23

## 2022-12-28 RX ADMIN — HYDROMORPHONE HYDROCHLORIDE 0.5 MG: 1 INJECTION, SOLUTION INTRAMUSCULAR; INTRAVENOUS; SUBCUTANEOUS at 16:57

## 2022-12-28 RX ADMIN — FENTANYL CITRATE 50 MCG: 50 INJECTION INTRAMUSCULAR; INTRAVENOUS at 13:42

## 2022-12-28 RX ADMIN — FENTANYL CITRATE 50 MCG: 50 INJECTION, SOLUTION INTRAMUSCULAR; INTRAVENOUS at 15:11

## 2022-12-28 RX ADMIN — MIDAZOLAM 1 MG: 1 INJECTION INTRAMUSCULAR; INTRAVENOUS at 08:24

## 2022-12-28 RX ADMIN — METOCLOPRAMIDE 10 MG: 5 INJECTION, SOLUTION INTRAMUSCULAR; INTRAVENOUS at 17:00

## 2022-12-28 RX ADMIN — METOCLOPRAMIDE HYDROCHLORIDE 10 MG: 5 INJECTION INTRAMUSCULAR; INTRAVENOUS at 08:24

## 2022-12-28 RX ADMIN — Medication 3 ML: at 21:35

## 2022-12-28 RX ADMIN — PANTOPRAZOLE SODIUM 40 MG: 40 INJECTION, POWDER, FOR SOLUTION INTRAVENOUS at 08:25

## 2022-12-28 RX ADMIN — LIDOCAINE HYDROCHLORIDE 60 MG: 20 INJECTION, SOLUTION INFILTRATION; PERINEURAL at 13:38

## 2022-12-28 RX ADMIN — ONDANSETRON 4 MG: 2 INJECTION INTRAMUSCULAR; INTRAVENOUS at 21:42

## 2022-12-28 RX ADMIN — SUGAMMADEX 200 MG: 100 INJECTION, SOLUTION INTRAVENOUS at 14:19

## 2022-12-28 RX ADMIN — GLYCOPYRROLATE 0.2 MG: 0.2 INJECTION INTRAMUSCULAR; INTRAVENOUS at 08:23

## 2022-12-28 RX ADMIN — FENTANYL CITRATE 50 MCG: 50 INJECTION INTRAMUSCULAR; INTRAVENOUS at 13:52

## 2022-12-28 NOTE — PLAN OF CARE
Goal Outcome Evaluation:           Progress: improving  Outcome Evaluation: 51 y/o s/POD 0 gastric sleeve laparoscopic and paraesophageal hernia repair and lysis of adhesions. AOX4. Pt up w/ assist x1 from stretcher. Pt remains DTV at 2030. Neruo checks WNL, no c/o numbness/tingling. Abdominal pain managed via IV meds. Lap sites clean and intact. PIV running LR at 150cc/hr. Continued NPO w/ sips of room temp water/gatorade. Pt chewed gum for relief of gas pains. Needs met. VSS. RA. Educated pt on falls precautions. Plan to d/c home tomorrow. WIll CTM.

## 2022-12-28 NOTE — ANESTHESIA PROCEDURE NOTES
Airway  Urgency: elective    Date/Time: 12/28/2022 1:40 PM  Airway not difficult    General Information and Staff    Patient location during procedure: OR  Anesthesiologist: Lm Kline MD  CRNA/CAA: Pema Felix CRNA    Indications and Patient Condition  Indications for airway management: airway protection    Preoxygenated: yes  MILS not maintained throughout  Mask difficulty assessment: 1 - vent by mask    Final Airway Details  Final airway type: endotracheal airway      Successful airway: ETT  Cuffed: yes   Successful intubation technique: direct laryngoscopy  Endotracheal tube insertion site: oral  Blade: Will  Blade size: 3  ETT size (mm): 7.0  Cormack-Lehane Classification: grade I - full view of glottis  Placement verified by: chest auscultation and capnometry   Measured from: lips  ETT/EBT  to lips (cm): 22  Number of attempts at approach: 1  Assessment: lips, teeth, and gum same as pre-op and atraumatic intubation    Additional Comments  PreO2 100%, FEO2 >85, SIVI, easy BMV, sniff position, ETT placed/ confirmed, attraumatic, teeth and lips as preop.

## 2022-12-28 NOTE — ANESTHESIA POSTPROCEDURE EVALUATION
Patient: Jeskia Angulo    Procedure Summary     Date: 12/28/22 Room / Location: [facility]  /  [facility]    Anesthesia Start: 1330 Anesthesia Stop: 1431    Procedure: GASTRIC SLEEVE LAPAROSCOPIC  AND PARAESOPHAGEAL HERNIA REPAIR AND LYSIS OF ADHESIONS (Abdomen) Diagnosis:       Obesity, Class III, BMI 40-49.9 (morbid obesity) ([facility])      (Obesity, Class III, BMI 40-49.9 (morbid obesity) ([facility]) [E66.01])    Surgeons: Lm Recio Jr., MD Provider: Lm Klnie MD    Anesthesia Type: general ASA Status: 3          Anesthesia Type: general    Vitals  Vitals Value Taken Time   /72 12/28/22 1615   Temp 36.3 °C (97.4 °F) 12/28/22 1600   Pulse 76 12/28/22 1618   Resp 16 12/28/22 1615   SpO2 94 % 12/28/22 1618   Vitals shown include unvalidated device data.        Post Anesthesia Care and Evaluation    Patient location during evaluation: bedside  Patient participation: complete - patient participated  Level of consciousness: awake  Pain management: adequate    Airway patency: patent  Anesthetic complications: No anesthetic complications  PONV Status: controlled  Cardiovascular status: acceptable  Respiratory status: acceptable  Hydration status: acceptable    Comments: --------------------            12/28/22               1615     --------------------   BP:       133/72     Pulse:      71       Resp:       16       Temp:                SpO2:      94%      --------------------

## 2022-12-28 NOTE — OP NOTE
PREOPERATIVE DIAGNOSIS:  Morbid obesity with multiple comorbidities as referenced in the most recent history and physical.    POSTOPERATIVE DIAGNOSIS:  Morbid obesity with multiple comorbidities as referenced in the most recent history and physical.  Paraesophageal hernia    PROCEDURES PERFORMED:  1.  Laparoscopic sleeve gastrectomy with Titan stapler # [id number]  2.  Laparoscopic paraesophageal hernia repair  2.  Tisseel application.   3.  Lysis adhesions mid abdomen    SURGEON:  Lm Reico Jr., MD    ASSISTANT: Melody AGUERO [facility]      Surgery assisted and facilitated by a certified physician assistant, who directly resulted in a decreased operative time, anesthetic time, wound exposure, and possibly of an operative wound infection, thereby decreasing patient morbidity and ultimately total expenditures.  The surgical assistant assisted in placement of trochars, take down of the gastrocolic omentum, short gastric vessels and dissection at the angle of His.  Also assisted in retraction of the stomach during stapling so as not to kink the gastric sleeve.  Also assisted in removing of the gastric specimen, closure of the fascial defect as well as closure of the skin incisions.    ANESTHESIA:  General endotracheal and TAP block with Ropivacaine mixture    ESTIMATED BLOOD LOSS:   Less than 25 mL unless dictated below.    FLUIDS:  Crystalloids.    SPECIMENS:  Gastric remnant    DRAINS:  None.    COUNTS:  Correct.    COMPLICATIONS:  None.    INDICATIONS:  This patient with morbid obesity and associated comorbidities presents for elective laparoscopic, possible open sleeve gastrectomy.  The patient has received medical clearance to proceed.  The patient has undergone our extensive educational process and consent process and wishes to proceed.    DESCRIPTION OF PROCEDURE:  The patient was brought to the operating room and placed supine upon the operating room table. SCD hose were placed.  The patient underwent  uneventful general endotracheal anesthesia per the anesthesiology staff. The abdomen was prepped with ChloraPrep and draped in the usual sterile fashion.  An Ioban was used as well if not allergic.  Anesthesia staff then passed a 38-Fr balloon bougie into the stomach to decompress.  A 5-10 mm transverse incision was made a few centimeters above and to the left of the umbilicus and the peritoneal cavity entered under direct camera visualization using a 5 or 10 mm 0° laparoscope and an Optiview trocar.  The abdomen was then insufflated to a pressure of 15 mmHg with CO2 gas.  Exploratory laparoscopy revealed no evidence of injury from the entrance technique and no significant abnormalities unless addendum dictated below.  An angled laparoscope was then used.  The patient was placed in reverse Trendelenburg position.  Under direct camera visualization a 19 mm trocar was placed in the right lateral subcostal position.  A 5 mm trocar was placed in the right midabdominal position.  A 5 mm trocar was placed in the left midabdominal position. A Jonnie retractor was placed through an epigastric incision and used to elevate the left lobe of the liver.  The fat pad was elevated and the left slick exposed.  At this point, approximately custodial along the greater curvature, the gastrocolic omentum was divided with the Enseal and this proceeded superiorly to the angle of His taking down the short gastric vessels.  All posterior attachments of the lesser sac and posterior aspect of the stomach to the pancreas were taken down as well.  The left slick was exposed along its length.  Dissection then proceeded medially taking down the greater curvature with an Enseal until just proximal to the pylorus.  The 38 Taiwanese bougie was then directed distally into the stomach to the pylorus.  The balloon was inflated with 15 cc of saline.  The stomach was marked in the 3 positions using indelible ink.  The 3 markings were at the angle of Hiss,  the incisura and antrum using 1cm, 3cm and 4-5cm respectively.  The Titan stapler was then placed through the 19 mm trocar into the abdomen and opened up and the stomach pulled in to the markings on the stomach.  Careful attention was made to stay 1 cm from the esophagus.  Positive pressure retraction was then used to size the stomach perfectly.  The balloon on the bougie was then deflated and placed to suction prior to closing the stapler.  The stapler was then fired and then removed after completion.  Areas of the staple line were oversewn with absorbable sutures as needed for bleeding or questionable staple lines.  At this point, the gastrectomy specimen was withdrawn through the 12 mm trocar site incision. The specimen staple line was inspected and was intact.  The specimen was then sent off to pathology.  At this point, the sleeve was submerged under saline and using the bougie to insufflate the stomach, a leak test was performed.  This revealed the sleeve to be watertight, no air bubbles, no leak, and no bleeding seen from the staple lines and no significant abnormalities.  Irrigation fluid from the abdomen was then suctioned.  The gastric sleeve staple line was then treated with Tisseel fibrin glue. The fascia at the 19 mm trocar site incision was closed with a single 0 Vicryl suture in a figure-of-eight fashion placed under direct laparoscopic camera visualization with a suture passer and tying the knot extracorporeally.  The fascia in the area was infiltrated with local anesthesia. All incisions were then infiltrated with local anesthetic. The remaining trocars were removed under direct camera visualization with no bleeding noted from their sites.  The abdomen was desufflated of gas. The skin in each incision was closed using 4-0 antibiotic impregnated Monocryl in a subcuticular fashion followed by Dermabond.  The patient tolerated the procedure well without complication and was taken to the recovery room  in stable condition.  All sponge, needle and instrument counts were correct.     The patient was noted to have a paraesophageal hernia.  The phrenoesophageal membrane was opened up with electrocautery and the right and left crura were cleaned off using sharp and blunt dissection.  The peritoneum overlying the right slick was incised and the plane along the hernia sac was developed.  The dissection then extended anteriorly and laterally to the left slick.  The base of the crural confluence was dissected free of adhesions to the hernia sac.  The hernia sac was carefully dissected into the mediastinum with caudal traction.  The interfaces between the pleura, pericardium, spine, and aorta were developed as a dissection was carried cephalically to the top of the hernia sac.  Sac contents were completely reduced back into the abdominal cavity.  Careful attention was made not to injure the vagus nerve.  The esophagus was identified and dissected circumferentially and along its previous stomach course in order to reduce tension, allowing the gastroesophageal junction to rest comfortably within the abdominal cavity.  The gastrosplenic ligament and the short gastric vessels were divided with the Enseal device.  The retroesophageal window was developed and the esophagus was retracted caudally.  The crural pillars were then approximated with 0 Ethibond sutures.  Anterior reinforcement was performed as well if needed.    Adhesions mid abdomen taken down partially with enseal.

## 2022-12-28 NOTE — ANESTHESIA PREPROCEDURE EVALUATION
Anesthesia Evaluation     Patient summary reviewed and Nursing notes reviewed   NPO Solid Status: > 8 hours             Airway   Mallampati: III  TM distance: >3 FB  Neck ROM: full  Possible difficult intubation  Dental - normal exam     Pulmonary - normal exam   Cardiovascular - normal exam        Neuro/Psych  GI/Hepatic/Renal/Endo    (+) morbid obesity, GERD,      Musculoskeletal     Abdominal   (+) obese,    Substance History      OB/GYN          Other   arthritis,                        Anesthesia Plan    ASA 3     general     intravenous induction     Anesthetic plan, risks, benefits, and alternatives have been provided, discussed and informed consent has been obtained with: patient.        CODE STATUS:

## 2022-12-29 VITALS
SYSTOLIC BLOOD PRESSURE: 154 MMHG | BODY MASS INDEX: 45.41 KG/M2 | OXYGEN SATURATION: 95 % | HEIGHT: 64 IN | HEART RATE: 88 BPM | TEMPERATURE: 98.5 F | WEIGHT: 266 LBS | RESPIRATION RATE: 16 BRPM | DIASTOLIC BLOOD PRESSURE: 84 MMHG

## 2022-12-29 LAB
ALBUMIN SERPL-MCNC: 3.7 G/DL (ref 3.5–5.2)
ALBUMIN/GLOB SERPL: 1.3 G/DL
ALP SERPL-CCNC: 63 U/L (ref 39–117)
ALT SERPL W P-5'-P-CCNC: 20 U/L (ref 1–33)
ANION GAP SERPL CALCULATED.3IONS-SCNC: 7 MMOL/L (ref 5–15)
AST SERPL-CCNC: 22 U/L (ref 1–32)
BASOPHILS # BLD AUTO: 0.01 10*3/MM3 (ref 0–0.2)
BASOPHILS NFR BLD AUTO: 0.1 % (ref 0–1.5)
BILIRUB SERPL-MCNC: 0.2 MG/DL (ref 0–1.2)
BUN SERPL-MCNC: 13 MG/DL (ref 6–20)
BUN/CREAT SERPL: 17.6 (ref 7–25)
CALCIUM SPEC-SCNC: 8.6 MG/DL (ref 8.6–10.5)
CHLORIDE SERPL-SCNC: 104 MMOL/L (ref 98–107)
CO2 SERPL-SCNC: 25 MMOL/L (ref 22–29)
CREAT SERPL-MCNC: 0.74 MG/DL (ref 0.57–1)
DEPRECATED RDW RBC AUTO: 40 FL (ref 37–54)
EGFRCR SERPLBLD CKD-EPI 2021: 98.1 ML/MIN/1.73
EOSINOPHIL # BLD AUTO: 0 10*3/MM3 (ref 0–0.4)
EOSINOPHIL NFR BLD AUTO: 0 % (ref 0.3–6.2)
ERYTHROCYTE [DISTWIDTH] IN BLOOD BY AUTOMATED COUNT: 12.6 % (ref 12.3–15.4)
GLOBULIN UR ELPH-MCNC: 2.8 GM/DL
GLUCOSE SERPL-MCNC: 92 MG/DL (ref 65–99)
HCT VFR BLD AUTO: 38.5 % (ref 34–46.6)
HGB BLD-MCNC: 12.3 G/DL (ref 12–15.9)
IMM GRANULOCYTES # BLD AUTO: 0.06 10*3/MM3 (ref 0–0.05)
IMM GRANULOCYTES NFR BLD AUTO: 0.5 % (ref 0–0.5)
LAB AP CASE REPORT: NORMAL
LYMPHOCYTES # BLD AUTO: 1.09 10*3/MM3 (ref 0.7–3.1)
LYMPHOCYTES NFR BLD AUTO: 9.5 % (ref 19.6–45.3)
MAGNESIUM SERPL-MCNC: 2 MG/DL (ref 1.6–2.6)
MCH RBC QN AUTO: 28 PG (ref 26.6–33)
MCHC RBC AUTO-ENTMCNC: 31.9 G/DL (ref 31.5–35.7)
MCV RBC AUTO: 87.5 FL (ref 79–97)
MONOCYTES # BLD AUTO: 0.56 10*3/MM3 (ref 0.1–0.9)
MONOCYTES NFR BLD AUTO: 4.9 % (ref 5–12)
NEUTROPHILS NFR BLD AUTO: 85 % (ref 42.7–76)
NEUTROPHILS NFR BLD AUTO: 9.72 10*3/MM3 (ref 1.7–7)
NRBC BLD AUTO-RTO: 0 /100 WBC (ref 0–0.2)
PATH REPORT.FINAL DX SPEC: NORMAL
PATH REPORT.GROSS SPEC: NORMAL
PHOSPHATE SERPL-MCNC: 4.3 MG/DL (ref 2.5–4.5)
PLATELET # BLD AUTO: 337 10*3/MM3 (ref 140–450)
PMV BLD AUTO: 9.7 FL (ref 6–12)
POTASSIUM SERPL-SCNC: 4.4 MMOL/L (ref 3.5–5.2)
PROT SERPL-MCNC: 6.5 G/DL (ref 6–8.5)
RBC # BLD AUTO: 4.4 10*6/MM3 (ref 3.77–5.28)
SODIUM SERPL-SCNC: 136 MMOL/L (ref 136–145)
WBC NRBC COR # BLD: 11.44 10*3/MM3 (ref 3.4–10.8)

## 2022-12-29 PROCEDURE — 80053 COMPREHEN METABOLIC PANEL: CPT | Performed by: SURGERY

## 2022-12-29 PROCEDURE — 25010000002 METOCLOPRAMIDE PER 10 MG: Performed by: SURGERY

## 2022-12-29 PROCEDURE — 83735 ASSAY OF MAGNESIUM: CPT | Performed by: SURGERY

## 2022-12-29 PROCEDURE — 25010000002 THIAMINE PER 100 MG: Performed by: SURGERY

## 2022-12-29 PROCEDURE — 25010000002 ENOXAPARIN PER 10 MG: Performed by: SURGERY

## 2022-12-29 PROCEDURE — 85025 COMPLETE CBC W/AUTO DIFF WBC: CPT | Performed by: SURGERY

## 2022-12-29 PROCEDURE — 84100 ASSAY OF PHOSPHORUS: CPT | Performed by: SURGERY

## 2022-12-29 PROCEDURE — 25010000002 CYANOCOBALAMIN PER 1000 MCG: Performed by: SURGERY

## 2022-12-29 RX ORDER — ONDANSETRON 4 MG/1
4 TABLET, FILM COATED ORAL EVERY 6 HOURS PRN
Qty: 10 TABLET | Refills: 0 | Status: SHIPPED | OUTPATIENT
Start: 2022-12-29

## 2022-12-29 RX ORDER — HYDROMORPHONE HYDROCHLORIDE 2 MG/1
2 TABLET ORAL EVERY 4 HOURS PRN
Qty: 18 TABLET | Refills: 0 | Status: SHIPPED | OUTPATIENT
Start: 2022-12-29 | End: 2023-02-13 | Stop reason: ALTCHOICE

## 2022-12-29 RX ADMIN — METOCLOPRAMIDE 10 MG: 5 INJECTION, SOLUTION INTRAMUSCULAR; INTRAVENOUS at 04:34

## 2022-12-29 RX ADMIN — FAMOTIDINE 20 MG: 10 INJECTION INTRAVENOUS at 09:45

## 2022-12-29 RX ADMIN — METOCLOPRAMIDE 10 MG: 5 INJECTION, SOLUTION INTRAMUSCULAR; INTRAVENOUS at 00:56

## 2022-12-29 RX ADMIN — Medication 3 ML: at 09:46

## 2022-12-29 RX ADMIN — CYANOCOBALAMIN 1000 MCG: 1000 INJECTION, SOLUTION INTRAMUSCULAR; SUBCUTANEOUS at 09:44

## 2022-12-29 RX ADMIN — ENOXAPARIN SODIUM 40 MG: 100 INJECTION SUBCUTANEOUS at 09:45

## 2022-12-29 RX ADMIN — GABAPENTIN 300 MG: 300 CAPSULE ORAL at 09:45

## 2022-12-29 RX ADMIN — METOCLOPRAMIDE 10 MG: 5 INJECTION, SOLUTION INTRAMUSCULAR; INTRAVENOUS at 13:03

## 2022-12-29 RX ADMIN — FOLIC ACID 250 ML/HR: 5 INJECTION, SOLUTION INTRAMUSCULAR; INTRAVENOUS; SUBCUTANEOUS at 00:56

## 2022-12-29 RX ADMIN — GABAPENTIN 300 MG: 300 CAPSULE ORAL at 00:56

## 2022-12-29 RX ADMIN — ACETAMINOPHEN 1000 MG: 500 TABLET, FILM COATED ORAL at 09:45

## 2022-12-29 RX ADMIN — ACETAMINOPHEN 1000 MG: 500 TABLET, FILM COATED ORAL at 04:34

## 2022-12-29 RX ADMIN — HYDROMORPHONE HYDROCHLORIDE 2 MG: 2 TABLET ORAL at 13:07

## 2022-12-29 NOTE — DISCHARGE SUMMARY
Discharge Summary    Patient name: Jesika Angulo    Medical record number: [id number]    Admission date: 12/28/2022  Discharge date:      Attending physician: Dr. Lm Recio    Primary care physician: Lee Best MD    Referring physician: Lm Recio Jr., MD  [address] [address]    Condition on discharge: Stable    Primary Diagnoses:  Morbid obesity with co-morbidities    Operative Procedure:  Laparoscopic gastric sleeve w/ PHR and DAVID     Jesika Angulo  is post op day one status post procedure listed. Patient denies shortness of air and lower extremity pain. Feels better than yesterday. No vomiting this am. Ambulating well and using incentive spirometer.          /84   Pulse 88   Temp 98.5 °F (36.9 °C) (Oral)   Resp 16   Ht 162.6 cm (64\")   Wt 121 kg (266 lb)   SpO2 95%   BMI 45.66 kg/m²     General:  alert, appears stated age, cooperative and no distress   Abdomen: soft, bowel sounds active, appropriate tenderness   Incision:   healing well, no drainage, no erythema, no hernia, no seroma, no swelling, no dehiscence, incision well approximated   Heart: Regular rate   Lungs: Clear to auscultation bilaterally     I reviewed the patient's new clinical results.     Lab Results (last 24 hours)     Procedure Component Value Units Date/Time    Comprehensive Metabolic Panel [[id number]] Collected: 12/29/22 0749    Specimen: Blood Updated: 12/29/22 0841     Glucose 92 mg/dL      BUN 13 mg/dL      Creatinine 0.74 mg/dL      Sodium 136 mmol/L      Potassium 4.4 mmol/L      Chloride 104 mmol/L      CO2 25.0 mmol/L      Calcium 8.6 mg/dL      Total Protein 6.5 g/dL      Albumin 3.7 g/dL      ALT (SGPT) 20 U/L      AST (SGOT) 22 U/L      Alkaline Phosphatase 63 U/L      Total Bilirubin 0.2 mg/dL      Globulin 2.8 gm/dL      A/G Ratio 1.3 g/dL      BUN/Creatinine Ratio 17.6     Anion Gap 7.0 mmol/L      eGFR 98.1 mL/min/1.73      Comment: National Kidney Foundation and  American Society of Nephrology (ASN) Task Force recommended calculation based on the Chronic Kidney Disease Epidemiology Collaboration (CKD-EPI) equation refit without adjustment for race.       Narrative:      GFR Normal >60  Chronic Kidney Disease <60  Kidney Failure <15      Phosphorus [[id number]]  (Normal) Collected: 12/29/22 0749    Specimen: Blood Updated: 12/29/22 0841     Phosphorus 4.3 mg/dL     Magnesium [[id number]]  (Normal) Collected: 12/29/22 0749    Specimen: Blood Updated: 12/29/22 0841     Magnesium 2.0 mg/dL     CBC & Differential [[id number]]  (Abnormal) Collected: 12/29/22 0444    Specimen: Blood Updated: 12/29/22 0528    Narrative:      The following orders were created for panel order CBC & Differential.  Procedure                               Abnormality         Status                     ---------                               -----------         ------                     CBC Auto Differential[[id number]]        Abnormal            Final result                 Please view results for these tests on the individual orders.    CBC Auto Differential [[id number]]  (Abnormal) Collected: 12/29/22 0444    Specimen: Blood Updated: 12/29/22 0528     WBC 11.44 10*3/mm3      RBC 4.40 10*6/mm3      Hemoglobin 12.3 g/dL      Hematocrit 38.5 %      MCV 87.5 fL      MCH 28.0 pg      MCHC 31.9 g/dL      RDW 12.6 %      RDW-SD 40.0 fl      MPV 9.7 fL      Platelets 337 10*3/mm3      Neutrophil % 85.0 %      Lymphocyte % 9.5 %      Monocyte % 4.9 %      Eosinophil % 0.0 %      Basophil % 0.1 %      Immature Grans % 0.5 %      Neutrophils, Absolute 9.72 10*3/mm3      Lymphocytes, Absolute 1.09 10*3/mm3      Monocytes, Absolute 0.56 10*3/mm3      Eosinophils, Absolute 0.00 10*3/mm3      Basophils, Absolute 0.01 10*3/mm3      Immature Grans, Absolute 0.06 10*3/mm3      nRBC 0.0 /100 WBC     Tissue Pathology Exam [[id number]] Collected: 12/28/22 5974    Specimen: Tissue from Stomach Updated: 12/28/22 8135              Assessment:      Doing well postoperatively.      Plan:   1. Continue Stage 1 diet  2. Continue with ambulation and Incentive spirometry  3. Plan for d/c home    Patient was seen and examined by Dr. Recio.    Hospital Course: The patient is a very pleasant 52 y.o. female that was admitted to the hospital with morbid obesity with co-morbidities. Patient underwent laparoscopic sleeve gastrectomy with paraesophageal hernia repair and lysis of adhesions (see OP note) without complication. The patient was then admitted to the bariatric unit per protocol where they remained stable. POD #1 she was started on a stage 1 bariatric diet which she tolerated so she was able to be discharged home in good condition.        Discharge medications:      Discharge Medications      New Medications      Instructions Start Date   HYDROmorphone 2 MG tablet  Commonly known as: Dilaudid   2 mg, Oral, Every 4 Hours PRN      ondansetron 4 MG tablet  Commonly known as: Zofran   4 mg, Oral, Every 6 Hours PRN         Continue These Medications      Instructions Start Date   acetaminophen 500 MG tablet  Commonly known as: TYLENOL   500 mg, Oral, Every 6 Hours PRN      Enoxaparin Sodium 40 MG/0.4ML solution prefilled syringe syringe  Commonly known as: LOVENOX   40 mg, Subcutaneous, Every 12 Hours Scheduled, Start after surgery unless instructed otherwise      folic acid-vit B6-vit B12 2.5-25-1 MG tablet tablet  Commonly known as: FOLBEE   1 tablet, Oral, Daily      multivitamin tablet tablet   1 tablet, Oral, Daily      omeprazole 40 MG capsule  Commonly known as: priLOSEC   40 mg, Oral, Daily      PROBIOTIC ADVANCED PO   1 capsule, Oral, Daily      ursodiol 300 MG capsule  Commonly known as: Actigall   300 mg, Oral, 2 Times Daily         Stop These Medications    Chlorhexidine Gluconate 2 % pads            Discharge instructions:  Per Bariatric manual; per our protocol      Follow-up appointment: Follow up with Dr. Recio in the office as  scheduled.  If not already scheduled call for appointment at [phone].

## 2022-12-29 NOTE — PROGRESS NOTES
Continued Stay Note  [facility]     Patient Name: Jesika Angulo  MRN: [id number]  Today's Date: 12/29/2022    Admit Date: 12/28/2022        Discharge Plan     Row Name 12/29/22 1658       Plan    Final Discharge Disposition Code 01 - home or self-care    Final Note D/c home               Discharge Codes    No documentation.               Expected Discharge Date and Time     Expected Discharge Date Expected Discharge Time    Dec 29, 2022             Dayna Barrera RN

## 2022-12-30 ENCOUNTER — OFFICE VISIT (OUTPATIENT)
Dept: BARIATRICS/WEIGHT MGMT | Facility: CLINIC | Age: 52
End: 2022-12-30

## 2022-12-30 VITALS
DIASTOLIC BLOOD PRESSURE: 91 MMHG | HEIGHT: 64 IN | BODY MASS INDEX: 45.93 KG/M2 | SYSTOLIC BLOOD PRESSURE: 149 MMHG | WEIGHT: 269 LBS | TEMPERATURE: 97.8 F | HEART RATE: 84 BPM

## 2022-12-30 DIAGNOSIS — Z98.84 S/P LAPAROSCOPIC SLEEVE GASTRECTOMY: ICD-10-CM

## 2022-12-30 DIAGNOSIS — Z71.3 DIETARY COUNSELING: ICD-10-CM

## 2022-12-30 DIAGNOSIS — E66.01 OBESITY, CLASS III, BMI 40-49.9 (MORBID OBESITY): Primary | ICD-10-CM

## 2022-12-30 PROCEDURE — 99024 POSTOP FOLLOW-UP VISIT: CPT | Performed by: NURSE PRACTITIONER

## 2022-12-30 RX ORDER — HYOSCYAMINE SULFATE 0.12 MG/1
0.12 TABLET SUBLINGUAL EVERY 6 HOURS PRN
Qty: 30 EACH | Refills: 0 | Status: SHIPPED | OUTPATIENT
Start: 2022-12-30 | End: 2023-02-13

## 2022-12-30 RX ORDER — MECLIZINE HYDROCHLORIDE 25 MG/1
TABLET ORAL
COMMUNITY
Start: 2022-12-16

## 2022-12-30 NOTE — PAYOR COMM NOTE
Jesika Angulo (52 y.o. Female)     ATTN: DISCHARGE SUMMARY AND OP NOTE FOR REVIEW: [id number]    UR DEPT: [phone],  [phone]    [facility]: NPI [id number]        Date of Birth   1970    Social Security Number       Address   [address] [place] [address]    Home Phone   [phone]    MRN   [id number]       [facility]ain   [facility]    Marital Status                               Admission Date   12/28/22    Admission Type   Elective    Admitting Provider   Lm Recio Jr., MD    Attending Provider       Department, Room/Bed   [facility] [address], P797/1       Discharge Date   12/29/2022    Discharge Disposition   Home or Self Care    Discharge Destination                               Attending Provider: (none)   Allergies: Codeine, Hydrocodone    Isolation: None   Infection: None   Code Status: Prior    Ht: 162.6 cm (64\")   Wt: 121 kg (266 lb)    Admission Cmt: None   Principal Problem: Obesity, Class III, BMI 40-49.9 (morbid obesity) (HCC) [E66.01]                 Active Insurance as of 12/28/2022     Primary Coverage     Payor Plan Insurance Group Employer/Plan Group    [id number]     Payor Plan Address Payor Plan Phone Number Payor Plan Fax Number Effective Dates    [address]   8/15/2020 - None Entered    [place] [address]       Subscriber Name Subscriber Birth Date Member ID       JESIKA ANGULO 1970 [id number]                 Emergency Contacts      (Rel.) Home Phone Work Phone Mobile Phone    LUCILA ESQUIVEL (Mother) [phone] -- [phone]    SHAHEENJACINTO (Son) [phone] -- [phone]               History & Physical      Lm Recio Jr., MD at 12/28/22 0831          H&P reviewed. The patient was examined and there are no changes to the H&P.          Electronically signed by Lm Recio Jr., MD at 12/28/22 0832   Source Note          Bariatric  Consult:  Referred by Lee Best MD    Jesika Angulo is here today for consult on Consult and Weight Loss (Sleeve consultation)      History of Present Illness:     Jesika Angulo is a 52 y.o. female with morbid obesity with co-morbidities including GERD who presents for surgical consultation for the above procedure. Jesika has completed the initial intake visit and has been examined by our nurse practitioner, dietician, psychologist and underwent the extensive educational teaching process under the guidance of our bariatric coordinator and myself. Jesika also has seen or if has not yet will see the educational video MARIO on the surgical procedure if available. Jesika attended today more educational teaching from our bariatric coordinator and myself. Jesika has had an extensive medical workup including a visit with their primary care physician, EKG, chest radiograph, blood work, EGD or UGI and possibly further testing. These have been reviewed by me and discussed with the patient. Jesika is now ready to proceed with surgery. Jesika presently denies nausea, vomiting, fever, chills, chest pain, shortness of air, melena, hematochezia, hemetemesis, dysuria, frequency, hematuria.       Past Medical History:   Diagnosis Date   • GERD (gastroesophageal reflux disease)    • Primary osteoarthritis of both knees    • Snoring        Encounter Diagnoses   Name Primary?   • Obesity, Class III, BMI 40-49.9 (morbid obesity) ([facility]) Yes   • Dietary counseling    • Gastroesophageal reflux disease, unspecified whether esophagitis present    • Hiatal hernia    • Chronic fatigue    • Chronic pain of both knees        Past Surgical History:   Procedure Laterality Date   • BREAST BIOPSY Left 2017   • ENDOSCOPY N/A 6/28/2022    Procedure: ESOPHAGOGASTRODUODENOSCOPY WITH BIOPSY;  Surgeon: Lm Recio Jr., MD;  Location: [facility] ENDOSCOPY;  Service: General;  Laterality: N/A;  PRE- GERD  POST- GASTRITIS   • TOE SURGERY     •  TUBAL ABDOMINAL LIGATION     • VENTRAL HERNIA REPAIR  1996    with mesh       Patient Active Problem List   Diagnosis   • Primary osteoarthritis of both knees   • Chronic fatigue   • Snoring   • Hiatal hernia   • Chronic pain of both knees   • GERD (gastroesophageal reflux disease)   • Obesity, Class III, BMI 40-49.9 (morbid obesity) ([facility])   • Dietary counseling       Allergies   Allergen Reactions   • Codeine GI Intolerance   • Hydrocodone GI Intolerance         Current Outpatient Medications:   •  acetaminophen (TYLENOL) 500 MG tablet, Take 500 mg by mouth Every 6 (Six) Hours As Needed., Disp: , Rfl:   •  multivitamin (THERAGRAN) tablet tablet, Take 1 tablet by mouth Daily., Disp: , Rfl:   •  omeprazole (priLOSEC) 40 MG capsule, Take 40 mg by mouth Daily., Disp: , Rfl:   •  Probiotic Product (PROBIOTIC ADVANCED PO), Take  by mouth., Disp: , Rfl:   •  brompheniramine-pseudoephedrine-DM 30-2-10 MG/5ML syrup, Take 5 mL by mouth 4 (Four) Times a Day As Needed for Congestion or Cough., Disp: 120 mL, Rfl: 0  •  Enoxaparin Sodium (LOVENOX) 40 MG/0.4ML solution prefilled syringe syringe, Inject 0.4 mL under the skin into the appropriate area as directed Every 12 (Twelve) Hours for 14 days. Start after surgery unless instructed otherwise, Disp: 11.2 mL, Rfl: 0  •  folic acid-vit B6-vit B12 (FOLBEE) 2.5-25-1 MG tablet tablet, Take 1 tablet by mouth Daily., Disp: 40 tablet, Rfl: 0  •  ursodiol (Actigall) 300 MG capsule, Take 1 capsule by mouth 2 (Two) Times a Day., Disp: 60 capsule, Rfl: 5    Social History     Socioeconomic History   • Marital status:    Tobacco Use   • Smoking status: Never   • Smokeless tobacco: Never   Vaping Use   • Vaping Use: Never used   Substance and Sexual Activity   • Alcohol use: Yes     Alcohol/week: 10.0 standard drinks     Types: 10 Standard drinks or equivalent per week     Comment: 5 times a week   • Drug use: No   • Sexual activity: Defer       Family History   Problem Relation Age  of Onset   • Hypertension Mother    • Obesity Mother    • Breast cancer Mother    • Hypertension Sister    • Hypertension Brother    • Breast cancer Maternal Grandmother    • Malig Hyperthermia Neg Hx        Review of Systems:  Review of Systems   Constitutional: Positive for fatigue.   Genitourinary: Positive for dysuria.   Musculoskeletal: Positive for arthralgias.   All other systems reviewed and are negative.        Physical Exam:    Vital Signs:      There is no height or weight on file to calculate BMI.  Temp: 97.3 °F (36.3 °C)   Heart Rate: 84   BP: 145/92       Physical Exam  Vitals reviewed.   HENT:      Head: Normocephalic and atraumatic.      Mouth/Throat:      Mouth: Mucous membranes are moist.      Pharynx: Oropharynx is clear.   Eyes:      General: No scleral icterus.     Extraocular Movements: Extraocular movements intact.      Conjunctiva/sclera: Conjunctivae normal.      Pupils: Pupils are equal, round, and reactive to light.   Neck:      Thyroid: No thyromegaly.   Cardiovascular:      Rate and Rhythm: Normal rate.   Pulmonary:      Effort: Pulmonary effort is normal. No respiratory distress.      Breath sounds: Normal breath sounds. No stridor. No wheezing or rhonchi.   Abdominal:      General: Bowel sounds are normal.      Palpations: Abdomen is soft.      Tenderness: There is no abdominal tenderness. There is no right CVA tenderness, left CVA tenderness, guarding or rebound.      Hernia: No hernia is present.   Musculoskeletal:         General: Normal range of motion.      Cervical back: Normal range of motion and neck supple.   Lymphadenopathy:      Cervical: No cervical adenopathy.   Skin:     General: Skin is warm and dry.      Findings: No erythema.   Neurological:      Mental Status: She is alert and oriented to person, place, and time.   Psychiatric:         Mood and Affect: Mood normal.         Behavior: Behavior normal.         Thought Content: Thought content normal.         Judgment:  Judgment normal.           Assessment:    Jesika Angulo is a 52 y.o. year old female with medically complicated severe obesity with a BMI of There is no height or weight on file to calculate BMI. and multiple co-morbidities listed in the encounter diagnosis.    I think she is an appropriate candidate for this surgery, and is ready to proceed.      Plan/Discussion/Summary:  Small to moderate size hiatal hernia per me.  Patient does take PPI.  H. pylori negative.  Patient complains of dysuria and instructed patient to talk to her PCP today for further evaluation.    The patient has returned to the office for a surgical consultation and has requested to proceed with gastric sleeve.  I have had the opportunity to obtain a history, examine the patient and review the patient's chart. The procedure could be done laparoscopically and or robotically.    The patient understands that surgery is a tool and that weight loss is not guaranteed but only seen in the context of appropriate use, regular follow up, exercise and making appropriate food choices.     I personally discussed the potential complications of the laparoscopic gastric sleeve with this patient.  The patient is well aware of potential complications of the surgery that include but not limited to bleeding, infections, deep vein thrombosis, pulmonary embolism, pulmonary complications such as pneumonia, cardiac event, hernias, small bowel obstruction, damage to the spleen or other organs, bowel injury, disfiguring scars, failure to lose weight, need for additional surgery, conversion to an open procedure and death.  The patient is also aware of complications which apply in particular to the gastric sleeve and can include but not limited to the leakage of gastric contents at the staple line, the development of an intra-abdominal abscess, gastroesophageal reflux disease, Henning's esophagus, ulcers, vitamin/mineral deficiencies, strictures, and the possibility of  converting this procedure to a Sonja-en-Y gastric bypass. The patient also understands the possibility of requiring an acid reducer medication for the rest of their life.    The risks, benefits, potential complications and alternative therapies were discussed at great length as outlined in our extensive consent forms, online consent and educational teaching processes.    The patient has confirmed the participation in the programs extensive educational activities.    All questions and concerns were answered to patient's satisfaction.  The patient now wishes to proceed with surgery.     The patient has agreed to a postoperative course of anitcoagulant therapy.      I instructed patient that the surgery could be laparoscopically and/or robotically.    I instructed patient to start on a H2 blocker or proton pump inhibitor if not already on one of these medications.    I explained in detail the procedures that are in the consent.  All of these procedures have a chance to convert to open if any technical challenges or complications do occur.  Bariatric surgery is not cosmetic surgery but rather a tool to help a patient make a life-long commitment lifestyle change including diet, exercise, behavior changes, and taking supplemental vitamins and minerals.    Problems after surgery may require more operations to correct them.    The risks, benefits, alternatives, and potential complications of all of the procedures were explained in detail including, but not limited to death, anesthesia and medication adverse effect, deep venous thrombosis, pulmonary embolism, trocar site/incisional hernia, wound infection, abdominal infection, bleeding, failure to lose weight, gain weight, a change in body image, metabolic complications with vitamin deficiences and anemia.    Weight loss expectations were discussed with the patient in detail. The weight loss operations most commonly performed are the sleeve gastrectomy and the Sonja-en-Y  gastric bypass. These operations result in weight losses up to approximately 25-35% of initial body weight 12 to 24 months after surgery with the gastric bypass usually the higher percent of weight loss but depends on patient using the tool.    For the gastric bypass and loop duodenal switch (KELTON-S) the risks include but not limited to the following early complications:  Anastomotic leak/peritonitis, Sonja/Alimentary/biliopancreatic limb obstruction, severe & minor wound infection/seroma, and nausea/vomiting.  Late complications can include but are not limited to malnutrition, vitamin deficiencies, frequent loose stools,  stomal stenosis, marginal ulcer, bowel obstruction, intussusception, internal, and incisional hernia.    Regarding the gastric sleeve, there is higher risk of dysphagia and reflux leading to possible Henning's esophagus compared to a gastric bypass, as well as risk of internal visceral/organ injury, splenectomy, bleeding, infection, leak (which could require further intervention possible conversion to Sonja-en-Y gastric bypass), stenosis and possibility of regaining weight.    Jesika was counseled regarding diagnostic results, instructions for management, risk factor reductions, prognosis, patient and family education, impressions, risks and benefits of treatment options and importance of compliance with treatment. Total time of the encounter was over 45 minutes counseling the patient regarding the procedure as above and reviewing as well as ordering labs, medications and the procedure.  The chart was also reviewed prior to seeing the patient reviewing previous testing, studies and labs.    Jesika understands the surgical procedures and the different surgical options that are available.  She understands the lifestyle changes that are required after surgery and has agreed to follow the guidelines outlined in the weight management program.  She also expressed understanding of the risks involved and  had all of female questions answered and desires to proceed.      Lm Recio MD  12/15/2022    Electronically signed by Lm Recio Jr., MD at 12/15/22 0802             Lm Recio Jr., MD at 12/15/22 0715          Bariatric Consult:  Referred by Lee Best MD    Jesika Angulo is here today for consult on Consult and Weight Loss (Sleeve consultation)      History of Present Illness:     Jesika Angulo is a 52 y.o. female with morbid obesity with co-morbidities including GERD who presents for surgical consultation for the above procedure. Jesika has completed the initial intake visit and has been examined by our nurse practitioner, dietician, psychologist and underwent the extensive educational teaching process under the guidance of our bariatric coordinator and myself. Jesika also has seen or if has not yet will see the educational video MARIO on the surgical procedure if available. Jesika attended today more educational teaching from our bariatric coordinator and myself. Jesika has had an extensive medical workup including a visit with their primary care physician, EKG, chest radiograph, blood work, EGD or UGI and possibly further testing. These have been reviewed by me and discussed with the patient. Jesika is now ready to proceed with surgery. Jesika presently denies nausea, vomiting, fever, chills, chest pain, shortness of air, melena, hematochezia, hemetemesis, dysuria, frequency, hematuria.       Past Medical History:   Diagnosis Date   • GERD (gastroesophageal reflux disease)    • Primary osteoarthritis of both knees    • Snoring        Encounter Diagnoses   Name Primary?   • Obesity, Class III, BMI 40-49.9 (morbid obesity) ([facility]) Yes   • Dietary counseling    • Gastroesophageal reflux disease, unspecified whether esophagitis present    • Hiatal hernia    • Chronic fatigue    • Chronic pain of both knees        Past Surgical History:   Procedure Laterality Date   • BREAST BIOPSY  Left 2017   • ENDOSCOPY N/A 6/28/2022    Procedure: ESOPHAGOGASTRODUODENOSCOPY WITH BIOPSY;  Surgeon: Lm Recio Jr., MD;  Location: [facility] ENDOSCOPY;  Service: General;  Laterality: N/A;  PRE- GERD  POST- GASTRITIS   • TOE SURGERY     • TUBAL ABDOMINAL LIGATION     • VENTRAL HERNIA REPAIR  1996    with mesh       Patient Active Problem List   Diagnosis   • Primary osteoarthritis of both knees   • Chronic fatigue   • Snoring   • Hiatal hernia   • Chronic pain of both knees   • GERD (gastroesophageal reflux disease)   • Obesity, Class III, BMI 40-49.9 (morbid obesity) ([facility])   • Dietary counseling       Allergies   Allergen Reactions   • Codeine GI Intolerance   • Hydrocodone GI Intolerance         Current Outpatient Medications:   •  acetaminophen (TYLENOL) 500 MG tablet, Take 500 mg by mouth Every 6 (Six) Hours As Needed., Disp: , Rfl:   •  multivitamin (THERAGRAN) tablet tablet, Take 1 tablet by mouth Daily., Disp: , Rfl:   •  omeprazole (priLOSEC) 40 MG capsule, Take 40 mg by mouth Daily., Disp: , Rfl:   •  Probiotic Product (PROBIOTIC ADVANCED PO), Take  by mouth., Disp: , Rfl:   •  brompheniramine-pseudoephedrine-DM 30-2-10 MG/5ML syrup, Take 5 mL by mouth 4 (Four) Times a Day As Needed for Congestion or Cough., Disp: 120 mL, Rfl: 0  •  Enoxaparin Sodium (LOVENOX) 40 MG/0.4ML solution prefilled syringe syringe, Inject 0.4 mL under the skin into the appropriate area as directed Every 12 (Twelve) Hours for 14 days. Start after surgery unless instructed otherwise, Disp: 11.2 mL, Rfl: 0  •  folic acid-vit B6-vit B12 (FOLBEE) 2.5-25-1 MG tablet tablet, Take 1 tablet by mouth Daily., Disp: 40 tablet, Rfl: 0  •  ursodiol (Actigall) 300 MG capsule, Take 1 capsule by mouth 2 (Two) Times a Day., Disp: 60 capsule, Rfl: 5    Social History     Socioeconomic History   • Marital status:    Tobacco Use   • Smoking status: Never   • Smokeless tobacco: Never   Vaping Use   • Vaping Use: Never used   Substance and  Sexual Activity   • Alcohol use: Yes     Alcohol/week: 10.0 standard drinks     Types: 10 Standard drinks or equivalent per week     Comment: 5 times a week   • Drug use: No   • Sexual activity: Defer       Family History   Problem Relation Age of Onset   • Hypertension Mother    • Obesity Mother    • Breast cancer Mother    • Hypertension Sister    • Hypertension Brother    • Breast cancer Maternal Grandmother    • Malig Hyperthermia Neg Hx        Review of Systems:  Review of Systems   Constitutional: Positive for fatigue.   Genitourinary: Positive for dysuria.   Musculoskeletal: Positive for arthralgias.   All other systems reviewed and are negative.        Physical Exam:    Vital Signs:      There is no height or weight on file to calculate BMI.  Temp: 97.3 °F (36.3 °C)   Heart Rate: 84   BP: 145/92       Physical Exam  Vitals reviewed.   HENT:      Head: Normocephalic and atraumatic.      Mouth/Throat:      Mouth: Mucous membranes are moist.      Pharynx: Oropharynx is clear.   Eyes:      General: No scleral icterus.     Extraocular Movements: Extraocular movements intact.      Conjunctiva/sclera: Conjunctivae normal.      Pupils: Pupils are equal, round, and reactive to light.   Neck:      Thyroid: No thyromegaly.   Cardiovascular:      Rate and Rhythm: Normal rate.   Pulmonary:      Effort: Pulmonary effort is normal. No respiratory distress.      Breath sounds: Normal breath sounds. No stridor. No wheezing or rhonchi.   Abdominal:      General: Bowel sounds are normal.      Palpations: Abdomen is soft.      Tenderness: There is no abdominal tenderness. There is no right CVA tenderness, left CVA tenderness, guarding or rebound.      Hernia: No hernia is present.   Musculoskeletal:         General: Normal range of motion.      Cervical back: Normal range of motion and neck supple.   Lymphadenopathy:      Cervical: No cervical adenopathy.   Skin:     General: Skin is warm and dry.      Findings: No erythema.    Neurological:      Mental Status: She is alert and oriented to person, place, and time.   Psychiatric:         Mood and Affect: Mood normal.         Behavior: Behavior normal.         Thought Content: Thought content normal.         Judgment: Judgment normal.           Assessment:    Jesika Angulo is a 52 y.o. year old female with medically complicated severe obesity with a BMI of There is no height or weight on file to calculate BMI. and multiple co-morbidities listed in the encounter diagnosis.    I think she is an appropriate candidate for this surgery, and is ready to proceed.      Plan/Discussion/Summary:  Small to moderate size hiatal hernia per me.  Patient does take PPI.  H. pylori negative.  Patient complains of dysuria and instructed patient to talk to her PCP today for further evaluation.    The patient has returned to the office for a surgical consultation and has requested to proceed with gastric sleeve.  I have had the opportunity to obtain a history, examine the patient and review the patient's chart. The procedure could be done laparoscopically and or robotically.    The patient understands that surgery is a tool and that weight loss is not guaranteed but only seen in the context of appropriate use, regular follow up, exercise and making appropriate food choices.     I personally discussed the potential complications of the laparoscopic gastric sleeve with this patient.  The patient is well aware of potential complications of the surgery that include but not limited to bleeding, infections, deep vein thrombosis, pulmonary embolism, pulmonary complications such as pneumonia, cardiac event, hernias, small bowel obstruction, damage to the spleen or other organs, bowel injury, disfiguring scars, failure to lose weight, need for additional surgery, conversion to an open procedure and death.  The patient is also aware of complications which apply in particular to the gastric sleeve and can include but  not limited to the leakage of gastric contents at the staple line, the development of an intra-abdominal abscess, gastroesophageal reflux disease, Henning's esophagus, ulcers, vitamin/mineral deficiencies, strictures, and the possibility of converting this procedure to a Sonja-en-Y gastric bypass. The patient also understands the possibility of requiring an acid reducer medication for the rest of their life.    The risks, benefits, potential complications and alternative therapies were discussed at great length as outlined in our extensive consent forms, online consent and educational teaching processes.    The patient has confirmed the participation in the programs extensive educational activities.    All questions and concerns were answered to patient's satisfaction.  The patient now wishes to proceed with surgery.     The patient has agreed to a postoperative course of anitcoagulant therapy.      I instructed patient that the surgery could be laparoscopically and/or robotically.    I instructed patient to start on a H2 blocker or proton pump inhibitor if not already on one of these medications.    I explained in detail the procedures that are in the consent.  All of these procedures have a chance to convert to open if any technical challenges or complications do occur.  Bariatric surgery is not cosmetic surgery but rather a tool to help a patient make a life-long commitment lifestyle change including diet, exercise, behavior changes, and taking supplemental vitamins and minerals.    Problems after surgery may require more operations to correct them.    The risks, benefits, alternatives, and potential complications of all of the procedures were explained in detail including, but not limited to death, anesthesia and medication adverse effect, deep venous thrombosis, pulmonary embolism, trocar site/incisional hernia, wound infection, abdominal infection, bleeding, failure to lose weight, gain weight, a change in body  image, metabolic complications with vitamin deficiences and anemia.    Weight loss expectations were discussed with the patient in detail. The weight loss operations most commonly performed are the sleeve gastrectomy and the Sonja-en-Y gastric bypass. These operations result in weight losses up to approximately 25-35% of initial body weight 12 to 24 months after surgery with the gastric bypass usually the higher percent of weight loss but depends on patient using the tool.    For the gastric bypass and loop duodenal switch (KELTON-S) the risks include but not limited to the following early complications:  Anastomotic leak/peritonitis, Sonja/Alimentary/biliopancreatic limb obstruction, severe & minor wound infection/seroma, and nausea/vomiting.  Late complications can include but are not limited to malnutrition, vitamin deficiencies, frequent loose stools,  stomal stenosis, marginal ulcer, bowel obstruction, intussusception, internal, and incisional hernia.    Regarding the gastric sleeve, there is higher risk of dysphagia and reflux leading to possible Henning's esophagus compared to a gastric bypass, as well as risk of internal visceral/organ injury, splenectomy, bleeding, infection, leak (which could require further intervention possible conversion to Sonja-en-Y gastric bypass), stenosis and possibility of regaining weight.    Jesika was counseled regarding diagnostic results, instructions for management, risk factor reductions, prognosis, patient and family education, impressions, risks and benefits of treatment options and importance of compliance with treatment. Total time of the encounter was over 45 minutes counseling the patient regarding the procedure as above and reviewing as well as ordering labs, medications and the procedure.  The chart was also reviewed prior to seeing the patient reviewing previous testing, studies and labs.    Jesika understands the surgical procedures and the different surgical options  that are available.  She understands the lifestyle changes that are required after surgery and has agreed to follow the guidelines outlined in the weight management program.  She also expressed understanding of the risks involved and had all of female questions answered and desires to proceed.      Lm Recio MD  12/15/2022    Electronically signed by Lm Recio Jr., MD at 12/15/22 0802          Operative/Procedure Notes (all)      Lm Recio Jr., MD at 12/28/22 0375  Version 1 of 1       PREOPERATIVE DIAGNOSIS:  Morbid obesity with multiple comorbidities as referenced in the most recent history and physical.    POSTOPERATIVE DIAGNOSIS:  Morbid obesity with multiple comorbidities as referenced in the most recent history and physical.  Paraesophageal hernia    PROCEDURES PERFORMED:  1.  Laparoscopic sleeve gastrectomy with Titan stapler # [id number]  2.  Laparoscopic paraesophageal hernia repair  2.  Tisseel application.   3.  Lysis adhesions mid abdomen    SURGEON:  Lm Recio Jr., MD    ASSISTANT: REBECA Weir      Surgery assisted and facilitated by a certified physician assistant, who directly resulted in a decreased operative time, anesthetic time, wound exposure, and possibly of an operative wound infection, thereby decreasing patient morbidity and ultimately total expenditures.  The surgical assistant assisted in placement of trochars, take down of the gastrocolic omentum, short gastric vessels and dissection at the angle of His.  Also assisted in retraction of the stomach during stapling so as not to kink the gastric sleeve.  Also assisted in removing of the gastric specimen, closure of the fascial defect as well as closure of the skin incisions.    ANESTHESIA:  General endotracheal and TAP block with Ropivacaine mixture    ESTIMATED BLOOD LOSS:   Less than 25 mL unless dictated below.    FLUIDS:  Crystalloids.    SPECIMENS:  Gastric remnant    DRAINS:  None.    COUNTS:   Correct.    COMPLICATIONS:  None.    INDICATIONS:  This patient with morbid obesity and associated comorbidities presents for elective laparoscopic, possible open sleeve gastrectomy.  The patient has received medical clearance to proceed.  The patient has undergone our extensive educational process and consent process and wishes to proceed.    DESCRIPTION OF PROCEDURE:  The patient was brought to the operating room and placed supine upon the operating room table. SCD hose were placed.  The patient underwent uneventful general endotracheal anesthesia per the anesthesiology staff. The abdomen was prepped with ChloraPrep and draped in the usual sterile fashion.  An Ioban was used as well if not allergic.  Anesthesia staff then passed a 38-Fr balloon bougie into the stomach to decompress.  A 5-10 mm transverse incision was made a few centimeters above and to the left of the umbilicus and the peritoneal cavity entered under direct camera visualization using a 5 or 10 mm 0° laparoscope and an Optiview trocar.  The abdomen was then insufflated to a pressure of 15 mmHg with CO2 gas.  Exploratory laparoscopy revealed no evidence of injury from the entrance technique and no significant abnormalities unless addendum dictated below.  An angled laparoscope was then used.  The patient was placed in reverse Trendelenburg position.  Under direct camera visualization a 19 mm trocar was placed in the right lateral subcostal position.  A 5 mm trocar was placed in the right midabdominal position.  A 5 mm trocar was placed in the left midabdominal position. A Jonnie retractor was placed through an epigastric incision and used to elevate the left lobe of the liver.  The fat pad was elevated and the left slick exposed.  At this point, approximately detention along the greater curvature, the gastrocolic omentum was divided with the Enseal and this proceeded superiorly to the angle of His taking down the short gastric vessels.  All  posterior attachments of the lesser sac and posterior aspect of the stomach to the pancreas were taken down as well.  The left slick was exposed along its length.  Dissection then proceeded medially taking down the greater curvature with an Enseal until just proximal to the pylorus.  The 38 French bougie was then directed distally into the stomach to the pylorus.  The balloon was inflated with 15 cc of saline.  The stomach was marked in the 3 positions using indelible ink.  The 3 markings were at the angle of Hiss, the incisura and antrum using 1cm, 3cm and 4-5cm respectively.  The Titan stapler was then placed through the 19 mm trocar into the abdomen and opened up and the stomach pulled in to the markings on the stomach.  Careful attention was made to stay 1 cm from the esophagus.  Positive pressure retraction was then used to size the stomach perfectly.  The balloon on the bougie was then deflated and placed to suction prior to closing the stapler.  The stapler was then fired and then removed after completion.  Areas of the staple line were oversewn with absorbable sutures as needed for bleeding or questionable staple lines.  At this point, the gastrectomy specimen was withdrawn through the 12 mm trocar site incision. The specimen staple line was inspected and was intact.  The specimen was then sent off to pathology.  At this point, the sleeve was submerged under saline and using the bougie to insufflate the stomach, a leak test was performed.  This revealed the sleeve to be watertight, no air bubbles, no leak, and no bleeding seen from the staple lines and no significant abnormalities.  Irrigation fluid from the abdomen was then suctioned.  The gastric sleeve staple line was then treated with Tisseel fibrin glue. The fascia at the 19 mm trocar site incision was closed with a single 0 Vicryl suture in a figure-of-eight fashion placed under direct laparoscopic camera visualization with a suture passer and tying the  knot extracorporeally.  The fascia in the area was infiltrated with local anesthesia. All incisions were then infiltrated with local anesthetic. The remaining trocars were removed under direct camera visualization with no bleeding noted from their sites.  The abdomen was desufflated of gas. The skin in each incision was closed using 4-0 antibiotic impregnated Monocryl in a subcuticular fashion followed by Dermabond.  The patient tolerated the procedure well without complication and was taken to the recovery room in stable condition.  All sponge, needle and instrument counts were correct.     The patient was noted to have a paraesophageal hernia.  The phrenoesophageal membrane was opened up with electrocautery and the right and left crura were cleaned off using sharp and blunt dissection.  The peritoneum overlying the right slick was incised and the plane along the hernia sac was developed.  The dissection then extended anteriorly and laterally to the left slick.  The base of the crural confluence was dissected free of adhesions to the hernia sac.  The hernia sac was carefully dissected into the mediastinum with caudal traction.  The interfaces between the pleura, pericardium, spine, and aorta were developed as a dissection was carried cephalically to the top of the hernia sac.  Sac contents were completely reduced back into the abdominal cavity.  Careful attention was made not to injure the vagus nerve.  The esophagus was identified and dissected circumferentially and along its previous stomach course in order to reduce tension, allowing the gastroesophageal junction to rest comfortably within the abdominal cavity.  The gastrosplenic ligament and the short gastric vessels were divided with the Enseal device.  The retroesophageal window was developed and the esophagus was retracted caudally.  The crural pillars were then approximated with 0 Ethibond sutures.  Anterior reinforcement was performed as well if  needed.    Adhesions mid abdomen taken down partially with enseal.      Electronically signed by Lm Recio Jr., MD at 12/28/22 1421          Discharge Summary      Christy Morris APRN at 12/29/22 0913     Attestation signed by Lm Recio Jr., MD at 12/29/22 1012    I have reviewed this documentation and agree.                  Discharge Summary    Patient name: Jesika Angulo    Medical record number: [id number]    Admission date: 12/28/2022  Discharge date:      Attending physician: Dr. Lm Recio    Primary care physician: Lee Best MD    Referring physician: Lm Recio Jr., MD  [address] [place] [address]    Condition on discharge: Stable    Primary Diagnoses:  Morbid obesity with co-morbidities    Operative Procedure:  Laparoscopic gastric sleeve w/ PHR and DAVID    Jesika Angulo  is post op day one status post procedure listed. Patient denies shortness of air and lower extremity pain. Feels better than yesterday. No vomiting this am. Ambulating well and using incentive spirometer.        /84   Pulse 88   Temp 98.5 °F (36.9 °C) (Oral)   Resp 16   Ht 162.6 cm (64\")   Wt 121 kg (266 lb)   SpO2 95%   BMI 45.66 kg/m²     General:  alert, appears stated age, cooperative and no distress   Abdomen: soft, bowel sounds active, appropriate tenderness   Incision:   healing well, no drainage, no erythema, no hernia, no seroma, no swelling, no dehiscence, incision well approximated   Heart: Regular rate   Lungs: Clear to auscultation bilaterally     I reviewed the patient's new clinical results.     Lab Results (last 24 hours)     Procedure Component Value Units Date/Time    Comprehensive Metabolic Panel [[id number]] Collected: 12/29/22 0749    Specimen: Blood Updated: 12/29/22 0841     Glucose 92 mg/dL      BUN 13 mg/dL      Creatinine 0.74 mg/dL      Sodium 136 mmol/L      Potassium 4.4 mmol/L      Chloride 104 mmol/L      CO2 25.0 mmol/L      Calcium 8.6  mg/dL      Total Protein 6.5 g/dL      Albumin 3.7 g/dL      ALT (SGPT) 20 U/L      AST (SGOT) 22 U/L      Alkaline Phosphatase 63 U/L      Total Bilirubin 0.2 mg/dL      Globulin 2.8 gm/dL      A/G Ratio 1.3 g/dL      BUN/Creatinine Ratio 17.6     Anion Gap 7.0 mmol/L      eGFR 98.1 mL/min/1.73      Comment: National Kidney Foundation and American Society of Nephrology (ASN) Task Force recommended calculation based on the Chronic Kidney Disease Epidemiology Collaboration (CKD-EPI) equation refit without adjustment for race.       Narrative:      GFR Normal >60  Chronic Kidney Disease <60  Kidney Failure <15      Phosphorus [[id number]]  (Normal) Collected: 12/29/22 0749    Specimen: Blood Updated: 12/29/22 0841     Phosphorus 4.3 mg/dL     Magnesium [[id number]]  (Normal) Collected: 12/29/22 0749    Specimen: Blood Updated: 12/29/22 0841     Magnesium 2.0 mg/dL     CBC & Differential [[id number]]  (Abnormal) Collected: 12/29/22 0444    Specimen: Blood Updated: 12/29/22 0528    Narrative:      The following orders were created for panel order CBC & Differential.  Procedure                               Abnormality         Status                     ---------                               -----------         ------                     CBC Auto Differential[[id number]]        Abnormal            Final result                 Please view results for these tests on the individual orders.    CBC Auto Differential [[id number]]  (Abnormal) Collected: 12/29/22 0444    Specimen: Blood Updated: 12/29/22 0528     WBC 11.44 10*3/mm3      RBC 4.40 10*6/mm3      Hemoglobin 12.3 g/dL      Hematocrit 38.5 %      MCV 87.5 fL      MCH 28.0 pg      MCHC 31.9 g/dL      RDW 12.6 %      RDW-SD 40.0 fl      MPV 9.7 fL      Platelets 337 10*3/mm3      Neutrophil % 85.0 %      Lymphocyte % 9.5 %      Monocyte % 4.9 %      Eosinophil % 0.0 %      Basophil % 0.1 %      Immature Grans % 0.5 %      Neutrophils, Absolute 9.72 10*3/mm3       Lymphocytes, Absolute 1.09 10*3/mm3      Monocytes, Absolute 0.56 10*3/mm3      Eosinophils, Absolute 0.00 10*3/mm3      Basophils, Absolute 0.01 10*3/mm3      Immature Grans, Absolute 0.06 10*3/mm3      nRBC 0.0 /100 WBC     Tissue Pathology Exam [[id number]] Collected: 12/28/22 1354    Specimen: Tissue from Stomach Updated: 12/28/22 3814            Assessment:     Doing well postoperatively.     Plan:   1. Continue Stage 1 diet  2. Continue with ambulation and Incentive spirometry  3. Plan for d/c home    Patient was seen and examined by Dr. Recio.    Hospital Course: The patient is a very pleasant 52 y.o. female that was admitted to the hospital with morbid obesity with co-morbidities. Patient underwent laparoscopic sleeve gastrectomy with paraesophageal hernia repair and lysis of adhesions (see OP note) without complication. The patient was then admitted to the bariatric unit per protocol where they remained stable. POD #1 she was started on a stage 1 bariatric diet which she tolerated so she was able to be discharged home in good condition.        Discharge medications:      Discharge Medications      New Medications      Instructions Start Date   HYDROmorphone 2 MG tablet  Commonly known as: Dilaudid   2 mg, Oral, Every 4 Hours PRN      ondansetron 4 MG tablet  Commonly known as: Zofran   4 mg, Oral, Every 6 Hours PRN         Continue These Medications      Instructions Start Date   acetaminophen 500 MG tablet  Commonly known as: TYLENOL   500 mg, Oral, Every 6 Hours PRN      Enoxaparin Sodium 40 MG/0.4ML solution prefilled syringe syringe  Commonly known as: LOVENOX   40 mg, Subcutaneous, Every 12 Hours Scheduled, Start after surgery unless instructed otherwise      folic acid-vit B6-vit B12 2.5-25-1 MG tablet tablet  Commonly known as: FOLBEE   1 tablet, Oral, Daily      multivitamin tablet tablet   1 tablet, Oral, Daily      omeprazole 40 MG capsule  Commonly known as: priLOSEC   40 mg, Oral, Daily       PROBIOTIC ADVANCED PO   1 capsule, Oral, Daily      ursodiol 300 MG capsule  Commonly known as: Actigall   300 mg, Oral, 2 Times Daily         Stop These Medications    Chlorhexidine Gluconate 2 % pads            Discharge instructions:  Per Bariatric manual; per our protocol      Follow-up appointment: Follow up with Dr. Recio in the office as scheduled.  If not already scheduled call for appointment at [phone].    Electronically signed by Thai Recio Jr., MD at 12/29/22 1012       Discharge Order (From admission, onward)     Start     Ordered    12/29/22 0912  Discharge patient  Once        Expected Discharge Date: 12/29/22    Discharge Disposition: Home or Self Care    Physician of Record for Attribution - Please select from Treatment Team: THAI RECIO JR [[id number]]    Review needed by CMO to determine Physician of Record: No       Question Answer Comment   Physician of Record for Attribution - Please select from Treatment Team THAI RECIO JR    Review needed by CMO to determine Physician of Record No        12/29/22 9474

## 2022-12-30 NOTE — PROGRESS NOTES
[facility] BARIATRIC SURGERY  [address] [facility] [address]  [facility] [address]  [phone]  [address] [id number] [phone]  Dept: [phone]  12/30/2022      Jesika Angulo.  [id number]  [id number]  1970  female      Chief Complaint   Patient presents with   • Post-op     2 days PO Sleeve       BH Post-Op Bariatric Surgery:   Jesika Angulo is status post laparopscopic Laparoscopic Sleeve with PEHR procedure, performed on 12/28/2022.     HPI:   Today's weight is 122 kg (269 lb) pounds, today's BMI is Body mass index is 46.15 kg/m².,@ has a  gain of 3 pounds since surgery. The patient reports a decreased portion size and loss of appetite.  Jesika Angulo denies nausea, vomiting, reflux, dysphagia and reports tolerating clears.  Has been having pain after drinking.  Only drinking room temp or warmer fluids.  Pain making it difficult for her to keep drinking.  The patient c/o appropriate post-op incisional discomfort that is improving. she is doing well with protein and water intake so far. Taking their vitamins, walking and using IS. Denies fevers, chills, chest pain or shortness of air.      Diet and Exercise: Diet history reviewed and discussed with the patient. Weight loss/gains to date discussed with the patient. No carbonated beverage consumption and exercising regularly- walking frequently.   Supplements: multivitamins, B-12, calcium, iron, B-1 and Vitamin D.   Patient is taking blood thinner as prescribed: has not started Lovenox yet  Current outpatient and discharge medications have been reconciled for the patient.  Reviewed by: FARZANA Garces        Review of Systems   Constitutional: Positive for activity change, appetite change and fatigue.   Respiratory: Negative for shortness of breath.    Cardiovascular: Negative for chest pain.   Gastrointestinal: Positive for abdominal pain.   All other systems reviewed and are  negative.      Patient Active Problem List   Diagnosis   • Primary osteoarthritis of both knees   • Chronic fatigue   • Snoring   • Hiatal hernia   • Chronic pain of both knees   • GERD (gastroesophageal reflux disease)   • Obesity, Class III, BMI 40-49.9 (morbid obesity) (Formerly [facility])   • Dietary counseling   • S/P laparoscopic sleeve gastrectomy       The following portions of the patient's history were reviewed and updated as appropriate: allergies, current medications, past medical history, past surgical history and problem list.    Vitals:    12/30/22 1003   BP: 149/91   Pulse: 84   Temp: 97.8 °F (36.6 °C)       Physical Exam  Vitals reviewed.   Constitutional:       General: She is not in acute distress.     Appearance: Normal appearance. She is morbidly obese.   HENT:      Head: Normocephalic and atraumatic.      Mouth/Throat:      Mouth: Mucous membranes are moist.      Pharynx: Oropharynx is clear.   Eyes:      General: No scleral icterus.     Extraocular Movements: Extraocular movements intact.      Conjunctiva/sclera: Conjunctivae normal.      Pupils: Pupils are equal, round, and reactive to light.   Cardiovascular:      Rate and Rhythm: Normal rate and regular rhythm.      Heart sounds: Normal heart sounds. No murmur heard.    No gallop.   Pulmonary:      Effort: Pulmonary effort is normal. No respiratory distress.   Abdominal:      General: Bowel sounds are normal.      Palpations: Abdomen is soft.   Musculoskeletal:         General: Normal range of motion.      Cervical back: Normal range of motion and neck supple.   Skin:     General: Skin is warm and dry.   Neurological:      General: No focal deficit present.      Mental Status: She is alert and oriented to person, place, and time.   Psychiatric:         Mood and Affect: Mood normal.         Behavior: Behavior normal.         Thought Content: Thought content normal.         Judgment: Judgment normal.         Assessment:   Post-op, the patient is doing well  but having spasms preventing her from drinking a lot.     Encounter Diagnoses   Name Primary?   • Obesity, Class III, BMI 40-49.9 (morbid obesity) (HCC) Yes   • S/P laparoscopic sleeve gastrectomy    • Dietary counseling        Plan:   Will send in rx for Levsin.  Instructed on use.  Would hold mtv and ursodiol until spasms stop.    Increase fluids and protein as tolerated.    Start lovenox today  Plans to return to work on light duty on 1/7/2023.  Full duty on 1/31/2023.  Not given.  Plans to send in fmla paperwork.     Reviewed with patient the importance of following the manual for diet progression. Increase activity as tolerated. Continue increasing daily intake of protein and water.   Return to work: the patient is to return to 3 weeks from their surgery date with no restrictions unless they develop medical problems in which we will see them back in the office. They received a note in our office today with their return to work date.  Activity restrictions: no lifting, pushing or pulling over 25lbs for 3 weeks.   Recommended patient be sure to get at least 70 grams of protein per day. Discussed with the patient the recommended amount of water per day to intake. Reviewed vitamin requirements. Be sure to do routine exercise and increase activity as tolerated. No asa, nsaids or steroids for 8 weeks if gastric sleeve procedure and lifelong if gastric bypass procedure.. Patient may use miralax as needed if necessary.     Instructions / Recommendations: dietary counseling recommended, recommended a daily protein intake of  grams, vitamin supplement(s) recommended, recommended exercising at least 150 minutes per week, behavior modifications recommended and instructed to call the office for concerns, questions, or problems.     The patient was instructed to follow up at one month follow up appt.     The patient was counseled regarding post op bariatric manual

## 2023-01-09 ENCOUNTER — TELEPHONE (OUTPATIENT)
Dept: BARIATRICS/WEIGHT MGMT | Facility: CLINIC | Age: 53
End: 2023-01-09
Payer: COMMERCIAL

## 2023-01-30 ENCOUNTER — OFFICE VISIT (OUTPATIENT)
Dept: BARIATRICS/WEIGHT MGMT | Facility: CLINIC | Age: 53
End: 2023-01-30
Payer: COMMERCIAL

## 2023-01-30 VITALS
TEMPERATURE: 97.3 F | WEIGHT: 253 LBS | SYSTOLIC BLOOD PRESSURE: 132 MMHG | DIASTOLIC BLOOD PRESSURE: 85 MMHG | HEART RATE: 75 BPM | HEIGHT: 64 IN | BODY MASS INDEX: 43.19 KG/M2

## 2023-01-30 DIAGNOSIS — Z71.3 DIETARY COUNSELING: ICD-10-CM

## 2023-01-30 DIAGNOSIS — G89.29 CHRONIC PAIN OF BOTH KNEES: ICD-10-CM

## 2023-01-30 DIAGNOSIS — R53.82 CHRONIC FATIGUE: ICD-10-CM

## 2023-01-30 DIAGNOSIS — K21.9 GASTROESOPHAGEAL REFLUX DISEASE, UNSPECIFIED WHETHER ESOPHAGITIS PRESENT: ICD-10-CM

## 2023-01-30 DIAGNOSIS — Z98.84 S/P LAPAROSCOPIC SLEEVE GASTRECTOMY: ICD-10-CM

## 2023-01-30 DIAGNOSIS — R06.83 SNORING: ICD-10-CM

## 2023-01-30 DIAGNOSIS — E66.01 OBESITY, CLASS III, BMI 40-49.9 (MORBID OBESITY): Primary | ICD-10-CM

## 2023-01-30 DIAGNOSIS — M25.561 CHRONIC PAIN OF BOTH KNEES: ICD-10-CM

## 2023-01-30 DIAGNOSIS — R13.10 DYSPHAGIA, UNSPECIFIED TYPE: ICD-10-CM

## 2023-01-30 DIAGNOSIS — M25.562 CHRONIC PAIN OF BOTH KNEES: ICD-10-CM

## 2023-01-30 PROCEDURE — 99024 POSTOP FOLLOW-UP VISIT: CPT | Performed by: NURSE PRACTITIONER

## 2023-01-30 RX ORDER — MELOXICAM 15 MG/1
TABLET ORAL
COMMUNITY
Start: 2023-01-29

## 2023-01-30 RX ORDER — SODIUM CHLORIDE, SODIUM LACTATE, POTASSIUM CHLORIDE, CALCIUM CHLORIDE 600; 310; 30; 20 MG/100ML; MG/100ML; MG/100ML; MG/100ML
1000 INJECTION, SOLUTION INTRAVENOUS ONCE
OUTPATIENT
Start: 2023-01-30

## 2023-01-30 RX ORDER — ONDANSETRON 2 MG/ML
16 INJECTION INTRAMUSCULAR; INTRAVENOUS ONCE
OUTPATIENT
Start: 2023-01-30

## 2023-01-30 RX ORDER — POLYETHYLENE GLYCOL 3350 17 G/17G
17 POWDER, FOR SOLUTION ORAL DAILY
Qty: 30 PACKET | Refills: 3 | Status: SHIPPED | OUTPATIENT
Start: 2023-01-30

## 2023-01-31 ENCOUNTER — PREP FOR SURGERY (OUTPATIENT)
Dept: OTHER | Facility: HOSPITAL | Age: 53
End: 2023-01-31
Payer: COMMERCIAL

## 2023-01-31 DIAGNOSIS — Z98.84 S/P LAPAROSCOPIC SLEEVE GASTRECTOMY: ICD-10-CM

## 2023-01-31 DIAGNOSIS — E66.01 OBESITY, CLASS III, BMI 40-49.9 (MORBID OBESITY): Primary | ICD-10-CM

## 2023-01-31 DIAGNOSIS — R13.10 DYSPHAGIA, UNSPECIFIED TYPE: ICD-10-CM

## 2023-01-31 RX ORDER — SODIUM CHLORIDE, SODIUM LACTATE, POTASSIUM CHLORIDE, CALCIUM CHLORIDE 600; 310; 30; 20 MG/100ML; MG/100ML; MG/100ML; MG/100ML
30 INJECTION, SOLUTION INTRAVENOUS CONTINUOUS
Status: CANCELLED | OUTPATIENT
Start: 2023-02-21

## 2023-02-01 ENCOUNTER — TELEPHONE (OUTPATIENT)
Dept: BARIATRICS/WEIGHT MGMT | Facility: CLINIC | Age: 53
End: 2023-02-01
Payer: COMMERCIAL

## 2023-02-13 NOTE — PROGRESS NOTES
[facility] BARIATRIC SURGERY  [address] [facility] [address]  [facility] [id number] [phone]  [address] [id number] [phone]  Dept: [phone]  2/13/2023      Jesika Angulo.  [id number]  [id number]  1970  female      Chief Complaint   Patient presents with   • Follow-up     1 mo fup sleeve       BH Post-Op Bariatric Surgery:   Jesika Angulo is status post Laparoscopic Sleeve with PEHR procedure, performed on 12/28/2022     HPI:   Today's weight is 115 kg (253 lb) pounds, today's BMI is Body mass index is 43.41 kg/m².,has a  loss of 16 pounds since the last visit andweight loss since surgery is 16 pounds. The patient reports a decreased portion size and loss of appetite.      Jesika Angulo reports very small portion sizes.  Can only tolerate about 3 bites of food.  Is on soft foods mostly.  Tuna salad with egg and parks is on ly 3 bites.       Diet and Exercise: Diet history reviewed and discussed with the patient. Weight loss/gains to date discussed with the patient. The patient states they are eating 70 grams of protein per day. She reports eating 3 meals per day, a typical portion size of 1 cup, eating 1 snacks per day, drinking 2 or more 8-oz. glasses of water per day, no carbonated beverage consumption and exercising regularly.     Supplements: bmtv.     Review of Systems   Constitutional: Positive for activity change and appetite change.   Gastrointestinal: Positive for nausea and vomiting.   All other systems reviewed and are negative.      Patient Active Problem List   Diagnosis   • Primary osteoarthritis of both knees   • Chronic fatigue   • Snoring   • Hiatal hernia   • Chronic pain of both knees   • GERD (gastroesophageal reflux disease)   • Obesity, Class III, BMI 40-49.9 (morbid obesity) (HCC)   • Dietary counseling   • S/P laparoscopic sleeve gastrectomy   • Dysphagia       Past Medical History:   Diagnosis Date   •  GERD (gastroesophageal reflux disease)    • Hiatal hernia    • Primary osteoarthritis of both knees        The following portions of the patient's history were reviewed and updated as appropriate: allergies, current medications, past medical history, past surgical history and problem list.    Vitals:    01/30/23 1617   BP: 132/85   Pulse: 75   Temp: 97.3 °F (36.3 °C)       Physical Exam  Vitals reviewed.   Constitutional:       General: She is not in acute distress.     Appearance: Normal appearance. She is morbidly obese.   HENT:      Head: Normocephalic and atraumatic.      Mouth/Throat:      Mouth: Mucous membranes are moist.      Pharynx: Oropharynx is clear.   Eyes:      General: No scleral icterus.     Extraocular Movements: Extraocular movements intact.      Conjunctiva/sclera: Conjunctivae normal.      Pupils: Pupils are equal, round, and reactive to light.   Cardiovascular:      Rate and Rhythm: Normal rate and regular rhythm.      Heart sounds: Normal heart sounds. No murmur heard.    No gallop.   Pulmonary:      Effort: Pulmonary effort is normal. No respiratory distress.   Abdominal:      General: Bowel sounds are normal.      Palpations: Abdomen is soft.   Musculoskeletal:         General: Normal range of motion.      Cervical back: Normal range of motion and neck supple.   Skin:     General: Skin is warm and dry.   Neurological:      General: No focal deficit present.      Mental Status: She is alert and oriented to person, place, and time.   Psychiatric:         Mood and Affect: Mood normal.         Behavior: Behavior normal.         Thought Content: Thought content normal.         Judgment: Judgment normal.         Assessment:   Post-op, the patient is struggling with po intake.     Encounter Diagnoses   Name Primary?   • Obesity, Class III, BMI 40-49.9 (morbid obesity) ([facility]) Yes   • S/P laparoscopic sleeve gastrectomy    • Gastroesophageal reflux disease, unspecified whether esophagitis present    •  Chronic pain of both knees    • Snoring    • Chronic fatigue    • Dietary counseling    • Dysphagia, unspecified type        Plan:   Will order fluids, egd.    Will get 1 month labs    Encouraged patient to be sure to get plenty of lean protein per day through small frequent meals all with a protein source.   Activity restrictions: none.   Recommended patient be sure to get at least 70 grams of protein per day by eating small, frequent meals all with high lean protein choices. Be sure to limit/cut back on daily carbohydrate intake. Discussed with the patient the recommended amount of water per day to intake- half of body weight in ounces. Reviewed vitamin requirements. Be sure to do routine exercise, 150 minutes per week minimum, including both cardio and strength training.     Instructions / Recommendations: dietary counseling recommended, recommended a daily protein intake of  grams, vitamin supplement(s) recommended, recommended exercising at least 150 minutes per week, behavior modifications recommended and instructed to call the office for concerns, questions, or problems.     The patient was instructed to follow up in 2 months .     Total time spent during this encounter today was 25 minutes

## 2023-02-13 NOTE — H&P (VIEW-ONLY)
[facility]ATR[facility] BARIATRIC SURGERY  [address] [facility] [address]  [facility] [id number] [phone]  [address] [id number] [phone]  Dept: [phone]  2/13/2023      Jesika Angulo.  [id number]  [id number]  1970  female      Chief Complaint   Patient presents with   • Follow-up     1 mo fup sleeve       BH Post-Op Bariatric Surgery:   Jesika Angulo is status post Laparoscopic Sleeve with PEHR procedure, performed on 12/28/2022     HPI:   Today's weight is 115 kg (253 lb) pounds, today's BMI is Body mass index is 43.41 kg/m².,has a  loss of 16 pounds since the last visit andweight loss since surgery is 16 pounds. The patient reports a decreased portion size and loss of appetite.      Jesika Angulo reports very small portion sizes.  Can only tolerate about 3 bites of food.  Is on soft foods mostly.  Tuna salad with egg and parks is on ly 3 bites.       Diet and Exercise: Diet history reviewed and discussed with the patient. Weight loss/gains to date discussed with the patient. The patient states they are eating 70 grams of protein per day. She reports eating 3 meals per day, a typical portion size of 1 cup, eating 1 snacks per day, drinking 2 or more 8-oz. glasses of water per day, no carbonated beverage consumption and exercising regularly.     Supplements: bmtv.     Review of Systems   Constitutional: Positive for activity change and appetite change.   Gastrointestinal: Positive for nausea and vomiting.   All other systems reviewed and are negative.      Patient Active Problem List   Diagnosis   • Primary osteoarthritis of both knees   • Chronic fatigue   • Snoring   • Hiatal hernia   • Chronic pain of both knees   • GERD (gastroesophageal reflux disease)   • Obesity, Class III, BMI 40-49.9 (morbid obesity) (HCC)   • Dietary counseling   • S/P laparoscopic sleeve gastrectomy   • Dysphagia       Past Medical History:   Diagnosis Date   •  GERD (gastroesophageal reflux disease)    • Hiatal hernia    • Primary osteoarthritis of both knees        The following portions of the patient's history were reviewed and updated as appropriate: allergies, current medications, past medical history, past surgical history and problem list.    Vitals:    01/30/23 1617   BP: 132/85   Pulse: 75   Temp: 97.3 °F (36.3 °C)       Physical Exam  Vitals reviewed.   Constitutional:       General: She is not in acute distress.     Appearance: Normal appearance. She is morbidly obese.   HENT:      Head: Normocephalic and atraumatic.      Mouth/Throat:      Mouth: Mucous membranes are moist.      Pharynx: Oropharynx is clear.   Eyes:      General: No scleral icterus.     Extraocular Movements: Extraocular movements intact.      Conjunctiva/sclera: Conjunctivae normal.      Pupils: Pupils are equal, round, and reactive to light.   Cardiovascular:      Rate and Rhythm: Normal rate and regular rhythm.      Heart sounds: Normal heart sounds. No murmur heard.    No gallop.   Pulmonary:      Effort: Pulmonary effort is normal. No respiratory distress.   Abdominal:      General: Bowel sounds are normal.      Palpations: Abdomen is soft.   Musculoskeletal:         General: Normal range of motion.      Cervical back: Normal range of motion and neck supple.   Skin:     General: Skin is warm and dry.   Neurological:      General: No focal deficit present.      Mental Status: She is alert and oriented to person, place, and time.   Psychiatric:         Mood and Affect: Mood normal.         Behavior: Behavior normal.         Thought Content: Thought content normal.         Judgment: Judgment normal.         Assessment:   Post-op, the patient is struggling with po intake.     Encounter Diagnoses   Name Primary?   • Obesity, Class III, BMI 40-49.9 (morbid obesity) ([facility]) Yes   • S/P laparoscopic sleeve gastrectomy    • Gastroesophageal reflux disease, unspecified whether esophagitis present    •  Chronic pain of both knees    • Snoring    • Chronic fatigue    • Dietary counseling    • Dysphagia, unspecified type        Plan:   Will order fluids, egd.    Will get 1 month labs    Encouraged patient to be sure to get plenty of lean protein per day through small frequent meals all with a protein source.   Activity restrictions: none.   Recommended patient be sure to get at least 70 grams of protein per day by eating small, frequent meals all with high lean protein choices. Be sure to limit/cut back on daily carbohydrate intake. Discussed with the patient the recommended amount of water per day to intake- half of body weight in ounces. Reviewed vitamin requirements. Be sure to do routine exercise, 150 minutes per week minimum, including both cardio and strength training.     Instructions / Recommendations: dietary counseling recommended, recommended a daily protein intake of  grams, vitamin supplement(s) recommended, recommended exercising at least 150 minutes per week, behavior modifications recommended and instructed to call the office for concerns, questions, or problems.     The patient was instructed to follow up in 2 months .     Total time spent during this encounter today was 25 minutes

## 2023-02-21 ENCOUNTER — ANESTHESIA EVENT (OUTPATIENT)
Dept: GASTROENTEROLOGY | Facility: HOSPITAL | Age: 53
End: 2023-02-21
Payer: COMMERCIAL

## 2023-02-21 ENCOUNTER — HOSPITAL ENCOUNTER (OUTPATIENT)
Facility: HOSPITAL | Age: 53
Setting detail: HOSPITAL OUTPATIENT SURGERY
Discharge: HOME | End: 2023-02-21
Attending: SURGERY | Admitting: SURGERY
Payer: COMMERCIAL

## 2023-02-21 ENCOUNTER — ANESTHESIA (OUTPATIENT)
Dept: GASTROENTEROLOGY | Facility: HOSPITAL | Age: 53
End: 2023-02-21
Payer: COMMERCIAL

## 2023-02-21 VITALS
HEIGHT: 64 IN | OXYGEN SATURATION: 98 % | HEART RATE: 68 BPM | SYSTOLIC BLOOD PRESSURE: 135 MMHG | BODY MASS INDEX: 41.83 KG/M2 | DIASTOLIC BLOOD PRESSURE: 90 MMHG | WEIGHT: 245 LBS | TEMPERATURE: 98.1 F | RESPIRATION RATE: 16 BRPM

## 2023-02-21 DIAGNOSIS — E66.01 OBESITY, CLASS III, BMI 40-49.9 (MORBID OBESITY): ICD-10-CM

## 2023-02-21 DIAGNOSIS — Z98.84 S/P LAPAROSCOPIC SLEEVE GASTRECTOMY: ICD-10-CM

## 2023-02-21 DIAGNOSIS — R13.10 DYSPHAGIA, UNSPECIFIED TYPE: ICD-10-CM

## 2023-02-21 PROCEDURE — 25010000002 ONDANSETRON PER 1 MG: Performed by: ANESTHESIOLOGY

## 2023-02-21 PROCEDURE — 43245 EGD DILATE STRICTURE: CPT | Performed by: SURGERY

## 2023-02-21 PROCEDURE — 25010000002 PROPOFOL 10 MG/ML EMULSION: Performed by: ANESTHESIOLOGY

## 2023-02-21 PROCEDURE — C1726 CATH, BAL DIL, NON-VASCULAR: HCPCS | Performed by: SURGERY

## 2023-02-21 PROCEDURE — 81025 URINE PREGNANCY TEST: CPT | Performed by: SURGERY

## 2023-02-21 RX ORDER — SODIUM CHLORIDE 0.9 % (FLUSH) 0.9 %
10 SYRINGE (ML) INJECTION EVERY 12 HOURS SCHEDULED
Status: DISCONTINUED | OUTPATIENT
Start: 2023-02-21 | End: 2023-02-21 | Stop reason: HOSPADM

## 2023-02-21 RX ORDER — SODIUM CHLORIDE 9 MG/ML
40 INJECTION, SOLUTION INTRAVENOUS AS NEEDED
Status: DISCONTINUED | OUTPATIENT
Start: 2023-02-21 | End: 2023-02-21 | Stop reason: HOSPADM

## 2023-02-21 RX ORDER — LIDOCAINE HYDROCHLORIDE 20 MG/ML
INJECTION, SOLUTION INFILTRATION; PERINEURAL AS NEEDED
Status: DISCONTINUED | OUTPATIENT
Start: 2023-02-21 | End: 2023-02-21 | Stop reason: SURG

## 2023-02-21 RX ORDER — SODIUM CHLORIDE 0.9 % (FLUSH) 0.9 %
10 SYRINGE (ML) INJECTION AS NEEDED
Status: DISCONTINUED | OUTPATIENT
Start: 2023-02-21 | End: 2023-02-21 | Stop reason: HOSPADM

## 2023-02-21 RX ORDER — ONDANSETRON 2 MG/ML
INJECTION INTRAMUSCULAR; INTRAVENOUS AS NEEDED
Status: DISCONTINUED | OUTPATIENT
Start: 2023-02-21 | End: 2023-02-21 | Stop reason: SURG

## 2023-02-21 RX ORDER — PROPOFOL 10 MG/ML
VIAL (ML) INTRAVENOUS AS NEEDED
Status: DISCONTINUED | OUTPATIENT
Start: 2023-02-21 | End: 2023-02-21 | Stop reason: SURG

## 2023-02-21 RX ORDER — SODIUM CHLORIDE, SODIUM LACTATE, POTASSIUM CHLORIDE, CALCIUM CHLORIDE 600; 310; 30; 20 MG/100ML; MG/100ML; MG/100ML; MG/100ML
30 INJECTION, SOLUTION INTRAVENOUS CONTINUOUS
Status: DISCONTINUED | OUTPATIENT
Start: 2023-02-21 | End: 2023-02-21 | Stop reason: HOSPADM

## 2023-02-21 RX ADMIN — PROPOFOL 200 MG: 10 INJECTION, EMULSION INTRAVENOUS at 12:58

## 2023-02-21 RX ADMIN — LIDOCAINE HYDROCHLORIDE 60 MG: 20 INJECTION, SOLUTION INFILTRATION; PERINEURAL at 12:58

## 2023-02-21 RX ADMIN — SODIUM CHLORIDE, POTASSIUM CHLORIDE, SODIUM LACTATE AND CALCIUM CHLORIDE 30 ML/HR: 600; 310; 30; 20 INJECTION, SOLUTION INTRAVENOUS at 12:50

## 2023-02-21 RX ADMIN — PROPOFOL 200 MCG/KG/MIN: 10 INJECTION, EMULSION INTRAVENOUS at 12:58

## 2023-02-21 RX ADMIN — ONDANSETRON 4 MG: 2 INJECTION INTRAMUSCULAR; INTRAVENOUS at 13:55

## 2023-02-21 NOTE — DISCHARGE INSTRUCTIONS
For the next 24 hours patient needs to be with a responsible adult.    For 24 hours DO NOT drive, operate machinery, appliances, drink alcohol, make important decisions or sign legal documents.    Start with a light or bland diet if you are feeling sick to your stomach otherwise advance to regular diet as tolerated.    Follow recommendations on procedure report if provided by your doctor.    Call Dr. Recio for problems [phone].    Problems may include but not limited to: large amounts of bleeding, trouble breathing, repeated vomiting, severe unrelieved pain, fever or chills.

## 2023-02-21 NOTE — ANESTHESIA POSTPROCEDURE EVALUATION
Patient: Jesika Angulo    Procedure Summary     Date: 02/21/23 Room / Location: [facility] ENDOSCOPY 1 /  [facility]    Anesthesia Start: 1253 Anesthesia Stop: 1307    Procedure: ESOPHAGOGASTRODUODENOSCOPY WITH BALLOON DILATATION 18-20MM (Esophagus) Diagnosis:       Obesity, Class III, BMI 40-49.9 (morbid obesity) ([facility])      S/P laparoscopic sleeve gastrectomy      Dysphagia, unspecified type      (Obesity, Class III, BMI 40-49.9 (morbid obesity) ([facility]) [E66.01])      (S/P laparoscopic sleeve gastrectomy [Z98.84])      (Dysphagia, unspecified type [R13.10])    Surgeons: Lm Recio Jr., MD Provider: Stan Zepeda MD    Anesthesia Type: MAC ASA Status: 3          Anesthesia Type: MAC    Vitals  Vitals Value Taken Time   /90 02/21/23 1326   Temp     Pulse 68 02/21/23 1326   Resp 16 02/21/23 1326   SpO2 98 % 02/21/23 1326           Post Anesthesia Care and Evaluation    Patient location during evaluation: PHASE II  Patient participation: complete - patient participated  Level of consciousness: awake and alert  Pain management: adequate    Airway patency: patent  Anesthetic complications: No anesthetic complications  PONV Status: none  Cardiovascular status: acceptable and hemodynamically stable  Respiratory status: acceptable, nonlabored ventilation and spontaneous ventilation  Hydration status: acceptable

## 2023-02-21 NOTE — OP NOTE
Surgeon: Lm Recio Jr., M.D.    Preoperative Diagnosis: Dysphagia with solids status post laparoscopic sleeve gastrectomy paraesophageal hernia repair    Postoperative Diagnosis: Gastritis    Procedure Performed: Transoral esophagogastroduodenoscopy with 18-20 balloon dilatation of pylorus    Indications: 52-year-old female status post laparoscopic sleeve gastrectomy and paraesophageal hernia repair with complaints of dysphagia with solids.    Procedure:     The procedure, indications, preparation and potential, patient were explained to the patient, who indicated understanding and signed the corresponding consent forms.  The patient was identified, taken to the endoscopy suite, and placed on the left side down decubitus position.  The patient underwent a MAC anesthesia and was appropriately monitored through the case by the anesthesia personnel using continuous pulse oximetry, blood pressure, and cardiac monitoring.  A bite block was placed.  After adequate IV sedation and using a transoral technique a lubed flexible endoscope was placed in the hypopharynx and advanced to the second portion of the duodenum.  The pylorus was mildly strictured and the scope had to be advanced with some pressure into the duodenum.  The scope was then withdrawn back into the gastric sleeve.  There was no stricture noted in the gastric sleeve unless dictated below.  No polyps or ulcers were seen unless dictated below.  The scope was then withdrawn back into the esophagus.  The Z line was regular and no erosive esophagitis seen unless dictated below.  Scope was then advanced back down into the antrum.  A 18-20 balloon catheter was then advanced across the pylorus and taken up in sequence and each level held for approximately 1 minute.  The catheter was deflated and removed.  The pylorus dilated up nicely.  The scope was then completely withdrawn after decompressing the stomach.  The patient tolerated the procedure well and left the  endoscopy suite in stable condition.    The pylorus opened up nicely after dilatation.  The sleeve was of normal size without stricture or dilatation.  Patchy erythema noted in the antrum but minimal.  Z-line was fairly regular no erosive esophagitis noted.  The balloon was Inflated brought back across the incisura and the GE junction.    Recommendations:     Instructed patient not to drink with her meals and to wait at least 30 to 40 minutes after each meal.  Follow-up in the office as scheduled

## 2023-02-21 NOTE — ANESTHESIA POSTPROCEDURE EVALUATION
Patient: Jesika Angulo    Procedure Summary     Date: 02/21/23 Room / Location: [facility] ENDOSCOPY 1 /  [facility]    Anesthesia Start: 1253 Anesthesia Stop: 1307    Procedure: ESOPHAGOGASTRODUODENOSCOPY WITH BALLOON DILATATION 18-20MM (Esophagus) Diagnosis:       Obesity, Class III, BMI 40-49.9 (morbid obesity) ([facility])      S/P laparoscopic sleeve gastrectomy      Dysphagia, unspecified type      (Obesity, Class III, BMI 40-49.9 (morbid obesity) ([facility]) [E66.01])      (S/P laparoscopic sleeve gastrectomy [Z98.84])      (Dysphagia, unspecified type [R13.10])    Surgeons: Lm Recio Jr., MD Provider: Stan Zepeda MD    Anesthesia Type: MAC ASA Status: 3          Anesthesia Type: MAC    Vitals  Vitals Value Taken Time   /90 02/21/23 1326   Temp     Pulse 68 02/21/23 1326   Resp 16 02/21/23 1326   SpO2 98 % 02/21/23 1326           Post Anesthesia Care and Evaluation    Patient location during evaluation: PHASE II  Patient participation: complete - patient participated  Level of consciousness: awake and alert  Pain management: adequate    Airway patency: patent  Anesthetic complications: No anesthetic complications  PONV Status: none  Cardiovascular status: acceptable and hemodynamically stable  Respiratory status: acceptable, nonlabored ventilation and spontaneous ventilation  Hydration status: acceptable

## 2023-02-21 NOTE — ANESTHESIA PREPROCEDURE EVALUATION
Anesthesia Evaluation     Patient summary reviewed and Nursing notes reviewed   NPO Solid Status: > 8 hours  NPO Liquid Status: > 6 hours           Airway   Mallampati: III  TM distance: >3 FB  Neck ROM: full  Possible difficult intubation  Dental - normal exam     Pulmonary - normal exam   Cardiovascular - normal exam    ECG reviewed        Neuro/Psych  GI/Hepatic/Renal/Endo    (+) obesity, morbid obesity, hiatal hernia, GERD,      ROS Comment: Hx gastric sleeve    Musculoskeletal     Abdominal   (+) obese,    Substance History      OB/GYN          Other   arthritis,                      Anesthesia Plan    ASA 3     MAC     intravenous induction     Anesthetic plan, risks, benefits, and alternatives have been provided, discussed and informed consent has been obtained with: patient.        CODE STATUS:

## 2023-08-28 ENCOUNTER — OFFICE VISIT (OUTPATIENT)
Dept: BARIATRICS/WEIGHT MGMT | Facility: CLINIC | Age: 53
End: 2023-08-28
Payer: COMMERCIAL

## 2023-08-28 VITALS
HEIGHT: 64 IN | WEIGHT: 216 LBS | TEMPERATURE: 98.1 F | BODY MASS INDEX: 36.88 KG/M2 | SYSTOLIC BLOOD PRESSURE: 120 MMHG | HEART RATE: 73 BPM | DIASTOLIC BLOOD PRESSURE: 83 MMHG

## 2023-08-28 DIAGNOSIS — Z98.84 S/P LAPAROSCOPIC SLEEVE GASTRECTOMY: ICD-10-CM

## 2023-08-28 DIAGNOSIS — E66.9 OBESITY, CLASS II, BMI 35-39.9: Primary | ICD-10-CM

## 2023-08-28 DIAGNOSIS — Z71.3 DIETARY COUNSELING: ICD-10-CM

## 2023-08-28 PROBLEM — E66.812 OBESITY, CLASS II, BMI 35-39.9: Status: ACTIVE | Noted: 2023-08-28

## 2023-08-28 NOTE — PROGRESS NOTES
[facility] BARIATRIC SURGERY  [address] [facility] [address]  [facility] [address]  [phone]  [address] [address] [phone]  Dept: [phone]  8/28/2023      Jesika Angulo.  [id number]  [id number]  1970  female      Chief Complaint   Patient presents with    Follow-up     Follow up       BH Post-Op Bariatric Surgery:   Jesika Angulo is status post Laparoscopic Sleeve with PEHR procedure, performed on 12/28/2022     HPI:   Today's weight is 98 kg (216 lb) pounds, today's BMI is Body mass index is 37.06 kg/mý., has a  loss of 37 pounds since the last visit and weight loss since surgery is 54 pounds. The patient reports a decreased portion size and loss of appetite.      Jesika Angulo denies nausea, vomiting, reflux, dysphagia and reports weight loss stall.  Tolerating regular diet.  Drinking 2 premier shakes per day.  Snacks on blueberries or peach.  Struggling with water intake.  Works at night.  When gets home from work wants to snack on something before lays down.  Tries to grab popcorn.  Most days after work has a Akins hard lemonade before bed.    Taking Omeprazole.  Has occasional heartburn.    Meal usually consists of chicken and veggie while she's at work.  Struggles with family members wanting to go out to eat and then falling back into old habits.     Diet and Exercise: Diet history reviewed and discussed with the patient. Weight loss/gains to date discussed with the patient. The patient states they are eating 70 grams of protein per day. She reports eating 3 meals per day, a typical portion size of 1/2 cup, eating 1 snacks per day, drinking 2 or more 8-oz. glasses of water per day, no carbonated beverage consumption and exercising regularly.     Supplements: mtv.     Review of Systems   Constitutional:  Positive for activity change and appetite change.   Respiratory:  Negative for shortness of breath.    Cardiovascular:   Negative for chest pain.   All other systems reviewed and are negative.    Patient Active Problem List   Diagnosis    Primary osteoarthritis of both knees    Chronic fatigue    Snoring    Hiatal hernia    Chronic pain of both knees    GERD (gastroesophageal reflux disease)    Obesity, Class III, BMI 40-49.9 (morbid obesity)    Dietary counseling    S/P laparoscopic sleeve gastrectomy    Dysphagia    Obesity, Class II, BMI 35-39.9       Past Medical History:   Diagnosis Date    GERD (gastroesophageal reflux disease)     Hiatal hernia     Primary osteoarthritis of both knees        The following portions of the patient's history were reviewed and updated as appropriate: allergies, current medications, past medical history, past surgical history, and problem list.    Vitals:    08/28/23 1001   BP: 120/83   Pulse: 73   Temp: 98.1 øF (36.7 øC)       Physical Exam  Vitals reviewed.   Constitutional:       General: She is not in acute distress.     Appearance: Normal appearance. She is obese.   HENT:      Head: Normocephalic and atraumatic.      Mouth/Throat:      Mouth: Mucous membranes are moist.      Pharynx: Oropharynx is clear.   Eyes:      General: No scleral icterus.     Extraocular Movements: Extraocular movements intact.      Conjunctiva/sclera: Conjunctivae normal.      Pupils: Pupils are equal, round, and reactive to light.   Cardiovascular:      Rate and Rhythm: Normal rate and regular rhythm.   Pulmonary:      Effort: Pulmonary effort is normal. No respiratory distress.   Abdominal:      General: Bowel sounds are normal.      Palpations: Abdomen is soft.   Musculoskeletal:         General: Normal range of motion.      Cervical back: Normal range of motion and neck supple.   Skin:     General: Skin is warm and dry.   Neurological:      General: No focal deficit present.      Mental Status: She is alert and oriented to person, place, and time.   Psychiatric:         Mood and Affect: Mood normal.          Behavior: Behavior normal.         Thought Content: Thought content normal.         Judgment: Judgment normal.       Assessment:   Post-op, the patient is doing well but struggling with weight loss stall.     Encounter Diagnoses   Name Primary?    Obesity, Class II, BMI 35-39.9 Yes    S/P laparoscopic sleeve gastrectomy     Dietary counseling        Plan:   Will get vitamin levels at next visit.  Discussed sugar/carb counts of some of her fav foods and drinks and giving other lower shane/sugar/carb options.    Given high protein snack ideas.    Her goal is to lose 100#.  12 month goal 200# and final goal 170#  Encouraged patient to be sure to get plenty of lean protein per day through small frequent meals all with a protein source.   Activity restrictions: none.   Recommended patient be sure to get at least 70 grams of protein per day by eating small, frequent meals all with high lean protein choices. Be sure to limit/cut back on daily carbohydrate intake. Discussed with the patient the recommended amount of water per day to intake- half of body weight in ounces. Reviewed vitamin requirements. Be sure to do routine exercise, 150 minutes per week minimum, including both cardio and strength training.     Instructions / Recommendations: dietary counseling recommended, recommended a daily protein intake of  grams, vitamin supplement(s) recommended, recommended exercising at least 150 minutes per week, behavior modifications recommended and instructed to call the office for concerns, questions, or problems.     The patient was instructed to follow up in 3-4 months.     Total time spent during this encounter today was 35 minutes

## 2023-08-28 NOTE — PATIENT INSTRUCTIONS
Snack Ideas after Bariatric Surgery  1. Beef or turkey Jerky  2. Oikos Triple Zero Greek yogurt with berries and sprinkle of granola or sliced almonds  3. String cheese and medium apple  4. Deli meat & cheese roll ups  5. Edamame in the Pod (Birds Eye Steamfresh)   6. Roasted/air fryer chickpeas  7. Quest Protein chips  8. Tuna pouch with 4-5 whole grain crackers  9. Hardboiled egg  10. Cottage cheese with fruit or tomatoes  11. Shrimp cocktail  12. Raw vegetables with hummus or black bean dip  13. P3 snacks  14. 1 oz nuts (any type) and air popped popcorn  15. Trail mix, ¬ cup  16. Protein smoothie  17. Egg salad or deviled eggs with low-fat parks   18. Ricotta cheese with vanilla extract, cinnamon and sweetener  19. Protein pudding (Premier or Fairlife shake mixed with 2 tbsp sugar free pudding mix)  20. Power Crunch Bar  21. Quest Protein Bar  22. Apple with 1 tbsp peanut butter  23. Egg bites (eggs or egg whites, vegetables and cheese cooked in muffin tin)  24. Protein energy bites  25. Ashu Pudding  26. High protein overnight oatmeal (1/2 cup oats, 2/3 cup protein shake, 1 tsp ashu or flax   and refrigerate overnight)  27. Refried beans with cheese (roll in low carb tortilla, optional)  28. Veggies and Ranch dip (Greek yogurt mixed w/ ranch seasoning packet)  29. Celery with 1 tbsp nut butter  30. Protein coffee (protein shake mixed with iced espresso or cold brew concentrate)   31. [place] Cakes Protein Balls (box mix)   32. Enlightened Bada Bean Bada Boom   33. Biena Chickpea snacks  34. Whisps cheese crisps  35. Turkey pepperoni and cheese slices  36. Popcorners Flex Protein Crisps  37. Sargento Balanced Breaks  38. Fruit & nut bars ( such as Larabars)   39. Hippeas Chickpea Puffs  40. Rotisserie chicken  41. Kashi GO Original cereal  42. Fage Greek yogurt  43. Hemant bread with deli meat  44. Turkey or chicken sausage links   45. Smoked salmon  46. Chomps turkey stick  47. [place] greek style vanilla  unsweetened almond milk yogurt  48. DIY trail mix (1 tbsp each of almonds, pepitas, walnuts, dried fruit and dark chocolate   chips)  49. [place] Cakes Blueberry power waffle   50. Baby bel cheese

## 2023-12-28 ENCOUNTER — LAB (OUTPATIENT)
Dept: LAB | Facility: HOSPITAL | Age: 53
End: 2023-12-28
Payer: COMMERCIAL

## 2023-12-28 ENCOUNTER — OFFICE VISIT (OUTPATIENT)
Dept: BARIATRICS/WEIGHT MGMT | Facility: CLINIC | Age: 53
End: 2023-12-28
Payer: COMMERCIAL

## 2023-12-28 VITALS
HEIGHT: 64 IN | SYSTOLIC BLOOD PRESSURE: 128 MMHG | DIASTOLIC BLOOD PRESSURE: 90 MMHG | HEART RATE: 75 BPM | WEIGHT: 225.5 LBS | BODY MASS INDEX: 38.5 KG/M2 | TEMPERATURE: 97.3 F

## 2023-12-28 DIAGNOSIS — M17.0 PRIMARY OSTEOARTHRITIS OF BOTH KNEES: ICD-10-CM

## 2023-12-28 DIAGNOSIS — E66.9 OBESITY, CLASS II, BMI 35-39.9: ICD-10-CM

## 2023-12-28 DIAGNOSIS — M25.561 CHRONIC PAIN OF BOTH KNEES: ICD-10-CM

## 2023-12-28 DIAGNOSIS — E66.9 OBESITY, CLASS II, BMI 35-39.9: Primary | ICD-10-CM

## 2023-12-28 DIAGNOSIS — K21.9 GASTROESOPHAGEAL REFLUX DISEASE, UNSPECIFIED WHETHER ESOPHAGITIS PRESENT: ICD-10-CM

## 2023-12-28 DIAGNOSIS — Z98.84 S/P LAPAROSCOPIC SLEEVE GASTRECTOMY: ICD-10-CM

## 2023-12-28 DIAGNOSIS — M25.562 CHRONIC PAIN OF BOTH KNEES: ICD-10-CM

## 2023-12-28 DIAGNOSIS — G89.29 CHRONIC PAIN OF BOTH KNEES: ICD-10-CM

## 2023-12-28 DIAGNOSIS — R53.82 CHRONIC FATIGUE: ICD-10-CM

## 2023-12-28 PROBLEM — E66.812 OBESITY, CLASS II, BMI 35-39.9: Status: ACTIVE | Noted: 2022-05-11

## 2023-12-28 PROBLEM — E66.812 OBESITY, CLASS II, BMI 35-39.9: Status: RESOLVED | Noted: 2023-08-28 | Resolved: 2023-12-28

## 2023-12-28 LAB
25(OH)D3 SERPL-MCNC: 38 NG/ML (ref 30–100)
ALBUMIN SERPL-MCNC: 4.4 G/DL (ref 3.5–5.2)
ALBUMIN/GLOB SERPL: 1.5 G/DL
ALP SERPL-CCNC: 70 U/L (ref 39–117)
ALT SERPL W P-5'-P-CCNC: 16 U/L (ref 1–33)
ANION GAP SERPL CALCULATED.3IONS-SCNC: 9 MMOL/L (ref 5–15)
AST SERPL-CCNC: 18 U/L (ref 1–32)
BASOPHILS # BLD AUTO: 0.04 10*3/MM3 (ref 0–0.2)
BASOPHILS NFR BLD AUTO: 0.6 % (ref 0–1.5)
BILIRUB SERPL-MCNC: <0.2 MG/DL (ref 0–1.2)
BUN SERPL-MCNC: 17 MG/DL (ref 6–20)
BUN/CREAT SERPL: 23.3 (ref 7–25)
CALCIUM SPEC-SCNC: 9.4 MG/DL (ref 8.6–10.5)
CHLORIDE SERPL-SCNC: 105 MMOL/L (ref 98–107)
CO2 SERPL-SCNC: 24 MMOL/L (ref 22–29)
CREAT SERPL-MCNC: 0.73 MG/DL (ref 0.57–1)
DEPRECATED RDW RBC AUTO: 38.9 FL (ref 37–54)
EGFRCR SERPLBLD CKD-EPI 2021: 99.1 ML/MIN/1.73
EOSINOPHIL # BLD AUTO: 0.43 10*3/MM3 (ref 0–0.4)
EOSINOPHIL NFR BLD AUTO: 6.9 % (ref 0.3–6.2)
ERYTHROCYTE [DISTWIDTH] IN BLOOD BY AUTOMATED COUNT: 12.3 % (ref 12.3–15.4)
FERRITIN SERPL-MCNC: 93.6 NG/ML (ref 13–150)
FOLATE SERPL-MCNC: >20 NG/ML (ref 4.78–24.2)
GLOBULIN UR ELPH-MCNC: 3 GM/DL
GLUCOSE SERPL-MCNC: 70 MG/DL (ref 65–99)
HCT VFR BLD AUTO: 36.2 % (ref 34–46.6)
HGB BLD-MCNC: 11.7 G/DL (ref 12–15.9)
IMM GRANULOCYTES # BLD AUTO: 0.01 10*3/MM3 (ref 0–0.05)
IMM GRANULOCYTES NFR BLD AUTO: 0.2 % (ref 0–0.5)
IRON 24H UR-MRATE: 47 MCG/DL (ref 37–145)
LYMPHOCYTES # BLD AUTO: 2.88 10*3/MM3 (ref 0.7–3.1)
LYMPHOCYTES NFR BLD AUTO: 46 % (ref 19.6–45.3)
MCH RBC QN AUTO: 28.3 PG (ref 26.6–33)
MCHC RBC AUTO-ENTMCNC: 32.3 G/DL (ref 31.5–35.7)
MCV RBC AUTO: 87.7 FL (ref 79–97)
MONOCYTES # BLD AUTO: 0.46 10*3/MM3 (ref 0.1–0.9)
MONOCYTES NFR BLD AUTO: 7.3 % (ref 5–12)
NEUTROPHILS NFR BLD AUTO: 2.44 10*3/MM3 (ref 1.7–7)
NEUTROPHILS NFR BLD AUTO: 39 % (ref 42.7–76)
NRBC BLD AUTO-RTO: 0 /100 WBC (ref 0–0.2)
PLATELET # BLD AUTO: 308 10*3/MM3 (ref 140–450)
PMV BLD AUTO: 8.6 FL (ref 6–12)
POTASSIUM SERPL-SCNC: 4.2 MMOL/L (ref 3.5–5.2)
PREALB SERPL-MCNC: 29 MG/DL (ref 20–40)
PROT SERPL-MCNC: 7.4 G/DL (ref 6–8.5)
RBC # BLD AUTO: 4.13 10*6/MM3 (ref 3.77–5.28)
SODIUM SERPL-SCNC: 138 MMOL/L (ref 136–145)
WBC NRBC COR # BLD AUTO: 6.26 10*3/MM3 (ref 3.4–10.8)

## 2023-12-28 PROCEDURE — 82746 ASSAY OF FOLIC ACID SERUM: CPT

## 2023-12-28 PROCEDURE — 84425 ASSAY OF VITAMIN B-1: CPT

## 2023-12-28 PROCEDURE — 85025 COMPLETE CBC W/AUTO DIFF WBC: CPT

## 2023-12-28 PROCEDURE — 82306 VITAMIN D 25 HYDROXY: CPT

## 2023-12-28 PROCEDURE — 80053 COMPREHEN METABOLIC PANEL: CPT

## 2023-12-28 PROCEDURE — 84134 ASSAY OF PREALBUMIN: CPT

## 2023-12-28 PROCEDURE — 82728 ASSAY OF FERRITIN: CPT

## 2023-12-28 PROCEDURE — 83540 ASSAY OF IRON: CPT

## 2023-12-28 PROCEDURE — 83921 ORGANIC ACID SINGLE QUANT: CPT

## 2023-12-28 PROCEDURE — 36415 COLL VENOUS BLD VENIPUNCTURE: CPT

## 2023-12-28 NOTE — PROGRESS NOTES
[facility] BARIATRIC SURGERY  [address] [facility] [address]  [facility] [id number] [phone]  [address] [facility] [address]  [facility] [id number] [phone]  Dept: [phone]  12/28/2023      Jesika Angulo.  [id number]  [id number]  1970  female      Chief Complaint   Patient presents with    Follow-up     1yr follow up sleeve       BH Post-Op Bariatric Surgery:   Jesika Angulo is status post Laparoscopic Sleeve with PEHR procedure, performed on 12/28/2022     HPI:   Today's weight is 102 kg (225 lb 8 oz) pounds, today's BMI is Body mass index is 38.69 kg/m²., has a  gain of 9 pounds since the last visit and weight loss since surgery is 45 pounds. The patient reports a decreased portion size and loss of appetite.      Jesika Angulo denies nausea, vomiting, dysphagia and reports tolerating regular diet.  Has had worsening reflux over the last few months.  Has been taking omeprazole daily or twice per day.  Has been drinking a glass of wine before bed. If doesn't finish before bed, will wake up in the middle of the night to finish.    Hasn't been eating as much protein through the holidays.  Was drinking a protein shake but now having 1-2 protein shakes per week.  Unsure of serving size.  States 4 bites to 4 oz of foods.  Has been snacking more often.  Went out to eat and had steak, salad, baked potato but took 3 days for her to eat.    B: banana nut bread  L: pizza  D: salad and pizza       S: sun chips  At work she drinks 1 bottle of water.  Tries to drink more water before bed.  Has been trying to increase her walking.       Diet and Exercise: Diet history reviewed and discussed with the patient. Weight loss/gains to date discussed with the patient. The patient states they are eating unknown grams of protein per day. She reports eating 3 meals per day, a typical portion size of 1/2 cup, eating 1 snacks per day, drinking 2 or more 8-oz. glasses of water per day,  no carbonated beverage consumption and exercising regularly.     Supplements: mtv.     Review of Systems   Constitutional:  Positive for activity change, appetite change and unexpected weight change.   Respiratory:  Negative for shortness of breath.    Cardiovascular:  Negative for chest pain.   Gastrointestinal:         Reflux   All other systems reviewed and are negative.      Patient Active Problem List   Diagnosis    Primary osteoarthritis of both knees    Chronic fatigue    Snoring    Hiatal hernia    Chronic pain of both knees    GERD (gastroesophageal reflux disease)    Obesity, Class II, BMI 35-39.9    Dietary counseling    S/P laparoscopic sleeve gastrectomy    Dysphagia       Past Medical History:   Diagnosis Date    GERD (gastroesophageal reflux disease)     Hiatal hernia     Primary osteoarthritis of both knees        The following portions of the patient's history were reviewed and updated as appropriate: allergies, current medications, past medical history, past surgical history, and problem list.    Vitals:    12/28/23 0732   BP: 128/90   Pulse: 75   Temp: 97.3 °F (36.3 °C)       Physical Exam  Vitals reviewed.   Constitutional:       General: She is not in acute distress.     Appearance: Normal appearance. She is morbidly obese and obese.   HENT:      Head: Normocephalic and atraumatic.      Mouth/Throat:      Mouth: Mucous membranes are moist.      Pharynx: Oropharynx is clear.   Eyes:      General: No scleral icterus.     Extraocular Movements: Extraocular movements intact.      Conjunctiva/sclera: Conjunctivae normal.      Pupils: Pupils are equal, round, and reactive to light.   Cardiovascular:      Rate and Rhythm: Normal rate and regular rhythm.   Pulmonary:      Effort: Pulmonary effort is normal. No respiratory distress.   Abdominal:      General: Bowel sounds are normal.      Palpations: Abdomen is soft.   Musculoskeletal:         General: Normal range of motion.      Cervical back: Normal  range of motion and neck supple.   Skin:     General: Skin is warm and dry.   Neurological:      General: No focal deficit present.      Mental Status: She is alert and oriented to person, place, and time.   Psychiatric:         Mood and Affect: Mood normal.         Behavior: Behavior normal.         Thought Content: Thought content normal.         Judgment: Judgment normal.         Assessment:   Post-op, the patient is struggling with weight gain and reflux.     Encounter Diagnoses   Name Primary?    Obesity, Class II, BMI 35-39.9 Yes    S/P laparoscopic sleeve gastrectomy     Gastroesophageal reflux disease, unspecified whether esophagitis present     Chronic pain of both knees     Primary osteoarthritis of both knees     Chronic fatigue        Plan:   Discussed limiting alcohol and should not drink before bed and during the night as she sleeps.  This is likely the cause of her increased reflux.    Increase water intake  Limit carbs and focus on high protein foods.    Will get labs  Offered appt with dietician but she declines  Encouraged patient to be sure to get plenty of lean protein per day through small frequent meals all with a protein source.   Activity restrictions: none.   Recommended patient be sure to get at least 70 grams of protein per day by eating small, frequent meals all with high lean protein choices. Be sure to limit/cut back on daily carbohydrate intake. Discussed with the patient the recommended amount of water per day to intake- half of body weight in ounces. Reviewed vitamin requirements. Be sure to do routine exercise, 150 minutes per week minimum, including both cardio and strength training.     Instructions / Recommendations: dietary counseling recommended, recommended a daily protein intake of  grams, vitamin supplement(s) recommended, recommended exercising at least 150 minutes per week, behavior modifications recommended and instructed to call the office for concerns, questions,  or problems.     The patient was instructed to follow up in 6 months.     Total time spent during this encounter today was 25 minutes

## 2023-12-28 NOTE — PATIENT INSTRUCTIONS
Snack Ideas after Bariatric Surgery  1. Beef or turkey Jerky  2. Oikos Triple Zero Greek yogurt with berries and sprinkle of granola or sliced almonds  3. String cheese and medium apple  4. Deli meat & cheese roll ups  5. Edamame in the Pod (Birds Eye Steamfresh)   6. Roasted/air fryer chickpeas  7. Quest Protein chips  8. Tuna pouch with 4-5 whole grain crackers  9. Hardboiled egg  10. Cottage cheese with fruit or tomatoes  11. Shrimp cocktail  12. Raw vegetables with hummus or black bean dip  13. P3 snacks  14. 1 oz nuts (any type) and air popped popcorn  15. Trail mix, ¼ cup  16. Protein smoothie  17. Egg salad or deviled eggs with low-fat parks   18. Ricotta cheese with vanilla extract, cinnamon and sweetener  19. Protein pudding (Premier or Fairlife shake mixed with 2 tbsp sugar free pudding mix)  20. Power Crunch Bar  21. Quest Protein Bar  22. Apple with 1 tbsp peanut butter  23. Egg bites (eggs or egg whites, vegetables and cheese cooked in muffin tin)  24. Protein energy bites  25. Ashu Pudding  26. High protein overnight oatmeal (1/2 cup oats, 2/3 cup protein shake, 1 tsp ashu or flax   and refrigerate overnight)  27. Refried beans with cheese (roll in low carb tortilla, optional)  28. Veggies and Ranch dip (Greek yogurt mixed w/ ranch seasoning packet)  29. Celery with 1 tbsp nut butter  30. Protein coffee (protein shake mixed with iced espresso or cold brew concentrate)   31. [place] Cakes Protein Balls (box mix)   32. Enlightened Bada Bean Bada Boom   33. Biena Chickpea snacks  34. Whisps cheese crisps  35. Turkey pepperoni and cheese slices  36. Popcorners Flex Protein Crisps  37. Sargento Balanced Breaks  38. Fruit & nut bars ( such as Larabars)   39. Hippeas Chickpea Puffs  40. Rotisserie chicken  41. Kashi GO Original cereal  42. Fage Greek yogurt  43. Hemant bread with deli meat  44. Turkey or chicken sausage links   45. Smoked salmon  46. Chomps turkey stick  47. [place] greek style vanilla  unsweetened almond milk yogurt  48. DIY trail mix (1 tbsp each of almonds, pepitas, walnuts, dried fruit and dark chocolate   chips)  49. [place] Cakes Blueberry power waffle   50. Baby bel cheese

## 2023-12-31 LAB — VIT B1 BLD-SCNC: 90.6 NMOL/L (ref 66.5–200)

## 2024-01-01 LAB — METHYLMALONATE SERPL-SCNC: 80 NMOL/L (ref 0–378)

## 2024-04-05 ENCOUNTER — OFFICE VISIT (OUTPATIENT)
Dept: BARIATRICS/WEIGHT MGMT | Facility: CLINIC | Age: 54
End: 2024-04-05
Payer: COMMERCIAL

## 2024-04-05 VITALS
DIASTOLIC BLOOD PRESSURE: 100 MMHG | BODY MASS INDEX: 38.41 KG/M2 | HEART RATE: 74 BPM | HEIGHT: 64 IN | TEMPERATURE: 97.8 F | WEIGHT: 225 LBS | SYSTOLIC BLOOD PRESSURE: 141 MMHG

## 2024-04-05 DIAGNOSIS — E66.9 OBESITY, CLASS II, BMI 35-39.9: Primary | ICD-10-CM

## 2024-04-05 DIAGNOSIS — Z98.84 S/P LAPAROSCOPIC SLEEVE GASTRECTOMY: ICD-10-CM

## 2024-04-05 DIAGNOSIS — Z71.3 DIETARY COUNSELING: ICD-10-CM

## 2024-04-05 RX ORDER — LIDOCAINE 50 MG/G
1 OINTMENT TOPICAL DAILY PRN
COMMUNITY
Start: 2024-01-25

## 2024-04-05 RX ORDER — SEMAGLUTIDE 0.25 MG/.5ML
0.25 INJECTION, SOLUTION SUBCUTANEOUS WEEKLY
Qty: 2 ML | Refills: 0 | Status: SHIPPED | OUTPATIENT
Start: 2024-04-05

## 2024-04-05 RX ORDER — SEMAGLUTIDE 1 MG/.5ML
1 INJECTION, SOLUTION SUBCUTANEOUS WEEKLY
Qty: 2 ML | Refills: 0 | Status: SHIPPED | OUTPATIENT
Start: 2024-04-05

## 2024-04-05 RX ORDER — SEMAGLUTIDE 0.5 MG/.5ML
0.5 INJECTION, SOLUTION SUBCUTANEOUS WEEKLY
Qty: 2 ML | Refills: 0 | Status: SHIPPED | OUTPATIENT
Start: 2024-04-05

## 2024-04-05 NOTE — PROGRESS NOTES
[facility] BARIATRIC SURGERY  [address]  [facility] [id number] [phone]  [address]  [facility] [id number] [phone]  Dept: [phone]  4/5/2024      Jesika Angulo.  [id number]  [id number]  1970  female      Chief Complaint   Patient presents with    Follow-up     Sleeve follow up        BH Post-Op Bariatric Surgery:   Jesika Angulo is status post Laparoscopic Sleeve with PEHR procedure, performed on 12/28/2022     HPI:   Today's weight is 102 kg (225 lb) pounds, today's BMI is Body mass index is 38.6 kg/m²., has a  loss of 0 pounds since the last visit and weight loss since surgery is 45 pounds. The patient reports a decreased portion size and loss of appetite.      Jesika Angulo denies nausea, vomiting, reflux, dysphagia and reports tolerating regular diet.  Has increased protein since last visit.  Is drinking more water.  Not a sweet eater.  Puts 2 spoons of sugar in coffee.  Going to gym more.  Feels like losing but scale doesn't show this.  Working night shift  B: protein shake  When goes to bed at 5 am eats apple/orange.    Trying to get in 3 protein shakes.  Gets at least 2.  Has a sandwich for other meal.  Was eating m&m's but stopped.    Drinking protein shake on the way home.    Is working out at the gym but knees are so painful.    Rash under excess skin on abdomen.  Tries to keep dry.  Uses deodarant spray and powder.       Diet and Exercise: Diet history reviewed and discussed with the patient. Weight loss/gains to date discussed with the patient. The patient states they are eating 70-90 grams of protein per day. She reports eating 3 meals per day, a typical portion size of 1/2 cup, eating 1 snacks per day, drinking 3 or more 8-oz. glasses of water per day, no carbonated beverage consumption and exercising regularly.     Supplements: mtv with iron.     Review of Systems   Constitutional:  Positive for  activity change and appetite change.   Respiratory:  Negative for shortness of breath.    Cardiovascular:  Negative for chest pain.   All other systems reviewed and are negative.      Patient Active Problem List   Diagnosis    Primary osteoarthritis of both knees    Chronic fatigue    Snoring    Hiatal hernia    Chronic pain of both knees    GERD (gastroesophageal reflux disease)    Obesity, Class II, BMI 35-39.9    Dietary counseling    S/P laparoscopic sleeve gastrectomy    Dysphagia       Past Medical History:   Diagnosis Date    GERD (gastroesophageal reflux disease)     Hiatal hernia     Primary osteoarthritis of both knees        The following portions of the patient's history were reviewed and updated as appropriate: allergies, current medications, past medical history, past surgical history, and problem list.    Vitals:    04/05/24 1216   BP: 141/100   Pulse: 74   Temp: 97.8 °F (36.6 °C)       Physical Exam  Vitals reviewed.   Constitutional:       General: She is not in acute distress.     Appearance: Normal appearance. She is obese.   HENT:      Head: Normocephalic and atraumatic.      Mouth/Throat:      Mouth: Mucous membranes are moist.      Pharynx: Oropharynx is clear.   Eyes:      General: No scleral icterus.     Extraocular Movements: Extraocular movements intact.      Conjunctiva/sclera: Conjunctivae normal.      Pupils: Pupils are equal, round, and reactive to light.   Cardiovascular:      Rate and Rhythm: Normal rate and regular rhythm.   Pulmonary:      Effort: Pulmonary effort is normal. No respiratory distress.   Abdominal:      General: Bowel sounds are normal.      Palpations: Abdomen is soft.   Musculoskeletal:         General: Normal range of motion.      Cervical back: Normal range of motion and neck supple.   Skin:     General: Skin is warm and dry.   Neurological:      General: No focal deficit present.      Mental Status: She is alert and oriented to person, place, and time.    Psychiatric:         Mood and Affect: Mood normal.         Behavior: Behavior normal.         Thought Content: Thought content normal.         Judgment: Judgment normal.         Assessment:   Post-op, the patient is struggling with a weight loss stall.     Encounter Diagnoses   Name Primary?    Obesity, Class II, BMI 35-39.9 Yes    S/P laparoscopic sleeve gastrectomy     Dietary counseling        Plan:   Will try Wegovy  Encouraged patient to be sure to get plenty of lean protein per day through small frequent meals all with a protein source.   Activity restrictions: none.   Recommended patient be sure to get at least 70 grams of protein per day by eating small, frequent meals all with high lean protein choices. Be sure to limit/cut back on daily carbohydrate intake. Discussed with the patient the recommended amount of water per day to intake- half of body weight in ounces. Reviewed vitamin requirements. Be sure to do routine exercise, 150 minutes per week minimum, including both cardio and strength training.     Instructions / Recommendations: dietary counseling recommended, recommended a daily protein intake of  grams, vitamin supplement(s) recommended, recommended exercising at least 150 minutes per week, behavior modifications recommended and instructed to call the office for concerns, questions, or problems.     The patient was instructed to follow up in 3 months.     Total time spent during this encounter today was 25 minutes

## 2024-04-10 ENCOUNTER — TELEPHONE (OUTPATIENT)
Dept: BARIATRICS/WEIGHT MGMT | Facility: CLINIC | Age: 54
End: 2024-04-10
Payer: COMMERCIAL

## 2024-04-10 RX ORDER — SEMAGLUTIDE 1 MG/.5ML
1 INJECTION, SOLUTION SUBCUTANEOUS WEEKLY
Qty: 2 ML | Refills: 0 | Status: SHIPPED | OUTPATIENT
Start: 2024-04-10

## 2024-04-10 RX ORDER — SEMAGLUTIDE 0.25 MG/.5ML
0.25 INJECTION, SOLUTION SUBCUTANEOUS WEEKLY
Qty: 2 ML | Refills: 0 | Status: SHIPPED | OUTPATIENT
Start: 2024-04-10

## 2024-04-10 RX ORDER — SEMAGLUTIDE 0.5 MG/.5ML
0.5 INJECTION, SOLUTION SUBCUTANEOUS WEEKLY
Qty: 2 ML | Refills: 0 | Status: SHIPPED | OUTPATIENT
Start: 2024-04-10

## 2024-04-10 NOTE — TELEPHONE ENCOUNTER
Spoke to patient. Her pharmacy didn't have the starting dose of Wegovy. They did have the 0.5mg. She is going to call around to see if any pharmacy has the starting dose. I explained to her when she does find the starting dose she can go ahead and start it and make sure she took it the same day every week. Informed her of possible side effects.

## 2024-07-01 ENCOUNTER — OFFICE VISIT (OUTPATIENT)
Dept: BARIATRICS/WEIGHT MGMT | Facility: CLINIC | Age: 54
End: 2024-07-01
Payer: COMMERCIAL

## 2024-07-01 VITALS
HEART RATE: 80 BPM | DIASTOLIC BLOOD PRESSURE: 92 MMHG | HEIGHT: 64 IN | WEIGHT: 208.5 LBS | TEMPERATURE: 97.7 F | BODY MASS INDEX: 35.59 KG/M2 | SYSTOLIC BLOOD PRESSURE: 132 MMHG

## 2024-07-01 DIAGNOSIS — Z98.84 S/P LAPAROSCOPIC SLEEVE GASTRECTOMY: ICD-10-CM

## 2024-07-01 DIAGNOSIS — E66.9 OBESITY, CLASS II, BMI 35-39.9: Primary | ICD-10-CM

## 2024-07-01 PROCEDURE — 99213 OFFICE O/P EST LOW 20 MIN: CPT | Performed by: PHYSICIAN ASSISTANT

## 2024-07-01 RX ORDER — SEMAGLUTIDE 1 MG/.5ML
1 INJECTION, SOLUTION SUBCUTANEOUS WEEKLY
Qty: 2 ML | Refills: 2 | Status: SHIPPED | OUTPATIENT
Start: 2024-07-01

## 2024-07-01 NOTE — PROGRESS NOTES
[facility] BARIATRIC SURGERY  [address]  [facility] [address] [phone]  [address]  [facility] [address] [phone]  Dept: [phone]  7/1/2024      Jesika Angulo.  [id number]  [id number]  1970  female      Chief Complaint   Patient presents with    Post-op     Follow up sleeve and wegovy       BH Post-Op Bariatric Surgery:   Jesika Angulo is status post Laparoscopic Sleeve with PEHR procedure, performed on 12/28/2022     HPI:       Today's weight is 94.6 kg (208 lb 8 oz) pounds, today's BMI is Body mass index is 35.77 kg/m²., she has a loss of 16.5 pounds since the last visit and her  weight loss since surgery is 61.5 pounds. The patient reports a decreased portion size and loss of appetite.    Jesika Angulo denies n/v/d/regurg and reports occ. Heartburn and constipation.  Pt has been taking Wegovy. She reports with the 1st inj. Of 1mg dose having increased side effects including n/v and GI cramps. Pt reports this resolved after a few days and did not happen with the next 3 doses.     Diet and Exercise: Diet history reviewed and discussed with the patient. Weight loss/gains to date discussed with the patient. The patient states they are eating 60 grams of protein per day. She reports eating 3 meals per day, a typical portion size of 1 cup, eating 0-2 snacks per day, drinking 4 or more 8-oz. glasses of water per day, no carbonated beverage consumption and exercising regularly.     Supplements: otc multi.     Review of Systems   Constitutional:  Positive for appetite change. Negative for fatigue and unexpected weight change.   HENT: Negative.     Eyes: Negative.    Respiratory: Negative.     Cardiovascular: Negative.  Negative for leg swelling.   Gastrointestinal:  Positive for constipation. Negative for abdominal distention, abdominal pain, diarrhea, nausea and vomiting.        Heartburn     Genitourinary:  Negative  for difficulty urinating, frequency and urgency.   Musculoskeletal:  Negative for back pain.   Skin: Negative.    Psychiatric/Behavioral: Negative.     All other systems reviewed and are negative.      Patient Active Problem List   Diagnosis    Primary osteoarthritis of both knees    Chronic fatigue    Snoring    Hiatal hernia    Chronic pain of both knees    GERD (gastroesophageal reflux disease)    Obesity, Class II, BMI 35-39.9    Dietary counseling    S/P laparoscopic sleeve gastrectomy    Dysphagia       Past Medical History:   Diagnosis Date    GERD (gastroesophageal reflux disease)     Hiatal hernia     Primary osteoarthritis of both knees        The following portions of the patient's history were reviewed and updated as appropriate: allergies, current medications, past medical history, past surgical history, and problem list.    Vitals:    07/01/24 1113   BP: 132/92   Pulse: 80   Temp: 97.7 °F (36.5 °C)       Physical Exam  Vitals reviewed.   Constitutional:       Appearance: Normal appearance.   HENT:      Head: Normocephalic and atraumatic.   Eyes:      Pupils: Pupils are equal, round, and reactive to light.   Cardiovascular:      Rate and Rhythm: Normal rate.   Pulmonary:      Effort: Pulmonary effort is normal. No respiratory distress.   Musculoskeletal:         General: Normal range of motion.      Cervical back: Normal range of motion and neck supple.   Neurological:      General: No focal deficit present.      Mental Status: She is alert and oriented to person, place, and time.   Psychiatric:         Mood and Affect: Mood normal.         Behavior: Behavior normal.         Assessment:   Post-op, the patient is doing well.     Encounter Diagnoses   Name Primary?    Obesity, Class II, BMI 35-39.9 Yes    S/P laparoscopic sleeve gastrectomy        Plan:   Had a long discussion with the pt regarding Wegovy and side effects.  Will refill Wegovy 1mg once weekly x 3 months.  Advised pt if any side effects or  issues to d/c med and call the office.    Encouraged patient to be sure to get plenty of lean protein per day through small frequent meals all with a protein source.   Activity restrictions: none.   Recommended patient be sure to get at least 70 grams of protein per day by eating small, frequent meals all with high lean protein choices. Be sure to limit/cut back on daily carbohydrate intake. Discussed with the patient the recommended amount of water per day to intake. Reviewed vitamin requirements. Be sure to do routine exercise consistently throughout the week, including both cardio and strength training.     Instructions / Recommendations: dietary counseling recommended, recommended a daily protein intake of  grams, vitamin supplement(s) recommended, recommended exercising consistently throughout the week, behavior modifications recommended and instructed to call the office for concerns, questions, or problems.     The patient was instructed to follow up in 3 months .     The patient was counseled regarding weight loss medication. Total time spent during this encounter today was 20 minutes.

## 2024-08-02 RX ORDER — SEMAGLUTIDE 1.7 MG/.75ML
1.7 INJECTION, SOLUTION SUBCUTANEOUS WEEKLY
Qty: 3 ML | Refills: 0 | Status: SHIPPED | OUTPATIENT
Start: 2024-08-02 | End: 2024-08-30

## 2024-08-27 DIAGNOSIS — E66.9 CLASS 2 OBESITY WITH BODY MASS INDEX (BMI) OF 35.0 TO 35.9 IN ADULT, UNSPECIFIED OBESITY TYPE, UNSPECIFIED WHETHER SERIOUS COMORBIDITY PRESENT: Primary | ICD-10-CM

## 2024-08-27 RX ORDER — SEMAGLUTIDE 1.7 MG/.75ML
1.7 INJECTION, SOLUTION SUBCUTANEOUS WEEKLY
Qty: 3 ML | Refills: 0 | Status: SHIPPED | OUTPATIENT
Start: 2024-08-27 | End: 2024-09-24

## 2024-09-25 DIAGNOSIS — E66.9 CLASS 2 OBESITY WITH BODY MASS INDEX (BMI) OF 35.0 TO 35.9 IN ADULT, UNSPECIFIED OBESITY TYPE, UNSPECIFIED WHETHER SERIOUS COMORBIDITY PRESENT: ICD-10-CM

## 2024-09-25 RX ORDER — SEMAGLUTIDE 1.7 MG/.75ML
INJECTION, SOLUTION SUBCUTANEOUS
Qty: 3 ML | Refills: 0 | Status: SHIPPED | OUTPATIENT
Start: 2024-09-25

## 2024-10-04 ENCOUNTER — OFFICE VISIT (OUTPATIENT)
Dept: BARIATRICS/WEIGHT MGMT | Facility: CLINIC | Age: 54
End: 2024-10-04
Payer: COMMERCIAL

## 2024-10-04 VITALS
HEIGHT: 64 IN | DIASTOLIC BLOOD PRESSURE: 86 MMHG | SYSTOLIC BLOOD PRESSURE: 128 MMHG | TEMPERATURE: 98.1 F | BODY MASS INDEX: 33.46 KG/M2 | WEIGHT: 196 LBS | HEART RATE: 78 BPM

## 2024-10-04 DIAGNOSIS — Z98.84 S/P LAPAROSCOPIC SLEEVE GASTRECTOMY: ICD-10-CM

## 2024-10-04 DIAGNOSIS — E66.811 OBESITY, CLASS I, BMI 30-34.9: Primary | ICD-10-CM

## 2024-10-04 DIAGNOSIS — Z71.3 DIETARY COUNSELING: ICD-10-CM

## 2024-10-04 DIAGNOSIS — E66.812 CLASS 2 OBESITY WITH BODY MASS INDEX (BMI) OF 35.0 TO 35.9 IN ADULT, UNSPECIFIED OBESITY TYPE, UNSPECIFIED WHETHER SERIOUS COMORBIDITY PRESENT: ICD-10-CM

## 2024-10-04 RX ORDER — SEMAGLUTIDE 1.7 MG/.75ML
1.7 INJECTION, SOLUTION SUBCUTANEOUS WEEKLY
Qty: 3 ML | Refills: 2 | Status: SHIPPED | OUTPATIENT
Start: 2024-10-04

## 2024-10-04 NOTE — PROGRESS NOTES
[facility] BARIATRIC SURGERY  [address]  [facility] [id number] [phone]  [address]  [facility] [id number] [phone]  Dept: [phone]  10/4/2024      Jesika Angulo.  [id number]  [id number]  1970  female      Chief Complaint   Patient presents with    Follow-up     Fup wegovy        Post-Op Bariatric Surgery:   Jesika Angulo is status post Laparoscopic Sleeve/PEHR procedure, performed on 12/28/2022.  Wegovy started on 4/5/2024 @ 225#.     HPI:   Today's weight is 88.9 kg (196 lb) pounds, today's BMI is Body mass index is 33.63 kg/m²., has a  loss of 12 pounds since the last visit and weight loss since surgery is 74 pounds. The patient reports a decreased portion size and loss of appetite.      Jesika Angulo denies nausea, vomiting, reflux, dysphagia and reports tolerating regular diet.  Tolerating Wegovy withoug issue.  Still able to eat without issue.  Struggles with meeting protein goals.  Protein shake sends to the bathroom.  Has some heartburn/reflux with certain foods or if eats too fast or too much.       Diet and Exercise: Diet history reviewed and discussed with the patient. Weight loss/gains to date discussed with the patient. The patient states they are eating 70 grams of protein per day. She reports eating 3 meals per day, a typical portion size of 1/2 cup, eating 1 snacks per day, drinking 3+ or more 8-oz. glasses of water per day, no carbonated beverage consumption and exercising regularly.     Supplements: mtv.     Review of Systems   Constitutional:  Positive for appetite change.   Respiratory:  Negative for shortness of breath.    Cardiovascular:  Negative for chest pain.   All other systems reviewed and are negative.      Patient Active Problem List   Diagnosis    Primary osteoarthritis of both knees    Chronic fatigue    Snoring    Hiatal hernia    Chronic pain of both knees    GERD  (gastroesophageal reflux disease)    Obesity, Class II, BMI 35-39.9    Dietary counseling    S/P laparoscopic sleeve gastrectomy    Dysphagia    Obesity, Class I, BMI 30-34.9       Past Medical History:   Diagnosis Date    GERD (gastroesophageal reflux disease)     Hiatal hernia     Primary osteoarthritis of both knees        The following portions of the patient's history were reviewed and updated as appropriate: allergies, current medications, past medical history, past surgical history, and problem list.    Vitals:    10/04/24 0907   BP: 128/86   Pulse: 78   Temp: 98.1 °F (36.7 °C)       Physical Exam  Vitals reviewed.   Constitutional:       General: She is not in acute distress.     Appearance: Normal appearance. She is morbidly obese and obese.   HENT:      Head: Normocephalic and atraumatic.      Mouth/Throat:      Mouth: Mucous membranes are moist.      Pharynx: Oropharynx is clear.   Eyes:      General: No scleral icterus.     Extraocular Movements: Extraocular movements intact.      Conjunctiva/sclera: Conjunctivae normal.      Pupils: Pupils are equal, round, and reactive to light.   Cardiovascular:      Rate and Rhythm: Normal rate and regular rhythm.   Pulmonary:      Effort: Pulmonary effort is normal. No respiratory distress.   Abdominal:      General: Bowel sounds are normal.      Palpations: Abdomen is soft.   Musculoskeletal:         General: Normal range of motion.      Cervical back: Normal range of motion and neck supple.   Skin:     General: Skin is warm and dry.   Neurological:      General: No focal deficit present.      Mental Status: She is alert and oriented to person, place, and time.   Psychiatric:         Mood and Affect: Mood normal.         Behavior: Behavior normal.         Thought Content: Thought content normal.         Judgment: Judgment normal.         Assessment:   Post-op, the patient is doing well.     Encounter Diagnoses   Name Primary?    Class 2 obesity with body mass index  (BMI) of 35.0 to 35.9 in adult, unspecified obesity type, unspecified whether serious comorbidity present     Obesity, Class I, BMI 30-34.9 Yes    S/P laparoscopic sleeve gastrectomy     Dietary counseling        Plan:   Will stay at the 1.7 mg dose for now.    Encouraged patient to be sure to get plenty of lean protein per day through small frequent meals all with a protein source.   Activity restrictions: none.   Recommended patient be sure to get at least 70 grams of protein per day by eating small, frequent meals all with high lean protein choices. Be sure to limit/cut back on daily carbohydrate intake. Discussed with the patient the recommended amount of water per day to intake- half of body weight in ounces. Reviewed vitamin requirements. Be sure to do routine exercise, 150 minutes per week minimum, including both cardio and strength training.     Instructions / Recommendations: dietary counseling recommended, recommended a daily protein intake of  grams, vitamin supplement(s) recommended, recommended exercising at least 150 minutes per week, behavior modifications recommended and instructed to call the office for concerns, questions, or problems.     The patient was instructed to follow up in 3 months.     Total time spent during this encounter today was 25 minutes

## 2025-01-09 ENCOUNTER — LAB (OUTPATIENT)
Dept: LAB | Facility: HOSPITAL | Age: 55
End: 2025-01-09
Payer: COMMERCIAL

## 2025-01-09 ENCOUNTER — OFFICE VISIT (OUTPATIENT)
Dept: BARIATRICS/WEIGHT MGMT | Facility: CLINIC | Age: 55
End: 2025-01-09
Payer: COMMERCIAL

## 2025-01-09 VITALS
HEIGHT: 64 IN | DIASTOLIC BLOOD PRESSURE: 66 MMHG | SYSTOLIC BLOOD PRESSURE: 129 MMHG | BODY MASS INDEX: 32.95 KG/M2 | TEMPERATURE: 97.8 F | HEART RATE: 70 BPM | WEIGHT: 193 LBS

## 2025-01-09 DIAGNOSIS — Z98.84 S/P LAPAROSCOPIC SLEEVE GASTRECTOMY: ICD-10-CM

## 2025-01-09 DIAGNOSIS — K91.2 POSTOPERATIVE MALABSORPTION: ICD-10-CM

## 2025-01-09 DIAGNOSIS — E66.811 OBESITY, CLASS I, BMI 30-34.9: Primary | ICD-10-CM

## 2025-01-09 DIAGNOSIS — E66.811 OBESITY, CLASS I, BMI 30-34.9: ICD-10-CM

## 2025-01-09 DIAGNOSIS — R53.82 CHRONIC FATIGUE: ICD-10-CM

## 2025-01-09 DIAGNOSIS — E66.812 CLASS 2 OBESITY WITH BODY MASS INDEX (BMI) OF 35.0 TO 35.9 IN ADULT, UNSPECIFIED OBESITY TYPE, UNSPECIFIED WHETHER SERIOUS COMORBIDITY PRESENT: ICD-10-CM

## 2025-01-09 DIAGNOSIS — Z71.3 DIETARY COUNSELING: ICD-10-CM

## 2025-01-09 LAB
25(OH)D3 SERPL-MCNC: 39.5 NG/ML (ref 30–100)
ALBUMIN SERPL-MCNC: 4 G/DL (ref 3.5–5.2)
ALBUMIN/GLOB SERPL: 1.3 G/DL
ALP SERPL-CCNC: 72 U/L (ref 39–117)
ALT SERPL W P-5'-P-CCNC: 14 U/L (ref 1–33)
ANION GAP SERPL CALCULATED.3IONS-SCNC: 9.1 MMOL/L (ref 5–15)
AST SERPL-CCNC: 21 U/L (ref 1–32)
BASOPHILS # BLD MANUAL: 0.1 10*3/MM3 (ref 0–0.2)
BASOPHILS NFR BLD MANUAL: 1 % (ref 0–1.5)
BILIRUB SERPL-MCNC: 0.2 MG/DL (ref 0–1.2)
BUN SERPL-MCNC: 18 MG/DL (ref 6–20)
BUN/CREAT SERPL: 19.8 (ref 7–25)
CALCIUM SPEC-SCNC: 9.1 MG/DL (ref 8.6–10.5)
CHLORIDE SERPL-SCNC: 102 MMOL/L (ref 98–107)
CO2 SERPL-SCNC: 26.9 MMOL/L (ref 22–29)
CREAT SERPL-MCNC: 0.91 MG/DL (ref 0.57–1)
DEPRECATED RDW RBC AUTO: 40.5 FL (ref 37–54)
EGFRCR SERPLBLD CKD-EPI 2021: 75.6 ML/MIN/1.73
EOSINOPHIL # BLD MANUAL: 0.67 10*3/MM3 (ref 0–0.4)
EOSINOPHIL NFR BLD MANUAL: 7 % (ref 0.3–6.2)
ERYTHROCYTE [DISTWIDTH] IN BLOOD BY AUTOMATED COUNT: 12.8 % (ref 12.3–15.4)
FERRITIN SERPL-MCNC: 163 NG/ML (ref 13–150)
FOLATE SERPL-MCNC: 12.3 NG/ML (ref 4.78–24.2)
GLOBULIN UR ELPH-MCNC: 3.2 GM/DL
GLUCOSE SERPL-MCNC: 82 MG/DL (ref 65–99)
HBA1C MFR BLD: 5.2 % (ref 4.8–5.6)
HCT VFR BLD AUTO: 33 % (ref 34–46.6)
HGB BLD-MCNC: 11.3 G/DL (ref 12–15.9)
IRON 24H UR-MRATE: 48 MCG/DL (ref 37–145)
LYMPHOCYTES # BLD MANUAL: 5.81 10*3/MM3 (ref 0.7–3.1)
LYMPHOCYTES NFR BLD MANUAL: 8 % (ref 5–12)
MCH RBC QN AUTO: 30.1 PG (ref 26.6–33)
MCHC RBC AUTO-ENTMCNC: 34.2 G/DL (ref 31.5–35.7)
MCV RBC AUTO: 88 FL (ref 79–97)
MONOCYTES # BLD: 0.76 10*3/MM3 (ref 0.1–0.9)
NEUTROPHILS # BLD AUTO: 2.19 10*3/MM3 (ref 1.7–7)
NEUTROPHILS NFR BLD MANUAL: 23 % (ref 42.7–76)
NRBC BLD AUTO-RTO: 0 /100 WBC (ref 0–0.2)
PLAT MORPH BLD: NORMAL
PLATELET # BLD AUTO: 351 10*3/MM3 (ref 140–450)
PMV BLD AUTO: 8.6 FL (ref 6–12)
POTASSIUM SERPL-SCNC: 4 MMOL/L (ref 3.5–5.2)
PREALB SERPL-MCNC: 28.6 MG/DL (ref 20–40)
PROT SERPL-MCNC: 7.2 G/DL (ref 6–8.5)
RBC # BLD AUTO: 3.75 10*6/MM3 (ref 3.77–5.28)
RBC MORPH BLD: NORMAL
SODIUM SERPL-SCNC: 138 MMOL/L (ref 136–145)
VARIANT LYMPHS NFR BLD MANUAL: 61 % (ref 19.6–45.3)
WBC MORPH BLD: NORMAL
WBC NRBC COR # BLD AUTO: 9.52 10*3/MM3 (ref 3.4–10.8)

## 2025-01-09 PROCEDURE — 82306 VITAMIN D 25 HYDROXY: CPT

## 2025-01-09 PROCEDURE — 82746 ASSAY OF FOLIC ACID SERUM: CPT

## 2025-01-09 PROCEDURE — 85025 COMPLETE CBC W/AUTO DIFF WBC: CPT

## 2025-01-09 PROCEDURE — 36415 COLL VENOUS BLD VENIPUNCTURE: CPT

## 2025-01-09 PROCEDURE — 84425 ASSAY OF VITAMIN B-1: CPT

## 2025-01-09 PROCEDURE — 80053 COMPREHEN METABOLIC PANEL: CPT

## 2025-01-09 PROCEDURE — 85007 BL SMEAR W/DIFF WBC COUNT: CPT

## 2025-01-09 PROCEDURE — 84134 ASSAY OF PREALBUMIN: CPT

## 2025-01-09 PROCEDURE — 82728 ASSAY OF FERRITIN: CPT

## 2025-01-09 PROCEDURE — 83921 ORGANIC ACID SINGLE QUANT: CPT

## 2025-01-09 PROCEDURE — 83540 ASSAY OF IRON: CPT

## 2025-01-09 PROCEDURE — 83036 HEMOGLOBIN GLYCOSYLATED A1C: CPT

## 2025-01-09 RX ORDER — SEMAGLUTIDE 1.7 MG/.75ML
1.7 INJECTION, SOLUTION SUBCUTANEOUS WEEKLY
Qty: 3 ML | Refills: 2 | Status: SHIPPED | OUTPATIENT
Start: 2025-01-09

## 2025-01-09 NOTE — PROGRESS NOTES
"[facility] BARIATRIC [facility] BARIATRIC SURGERY  [address]  [facility] [id number] [phone]  [address]  [facility] [id number] [phone]  Dept: [phone]  1/9/2025      Jesika Angulo.  [id number]  [id number]  1970  female      Chief Complaint   Patient presents with    Follow-up     Follow up sleeve/RX       [facility] Post-Op Bariatric Surgery:   Jesika Angulo is status post Laparoscopic Sleeve/PEHR procedure, performed on 12/20/2022.  Started Wegovy 4/5/2024 @ 225#..       HPI:   Today's weight is 87.5 kg (193 lb) pounds, today's BMI is Body mass index is 33.11 kg/m²., has a  loss of 3 pounds since the last visit and weight loss since surgery is 77 pounds. The patient reports a decreased portion size and loss of appetite.      Jesika Angulo denies n/v/r/d and reports tolerating regular diet.  C/o some constipation.  BM few and far between.  Sometimes stool is normal but 1 time per month she has lots of pain, \"gut ripper\", sweating, hard stool.  Drinks about 20 oz water per day.    During holidays didn't take medication/Wegovy because wanted to be able to eat a little more.  Didn't notice much of a change in appetite without it.  Thinks last dose she took was 12/26/24.    Did get information that her insurance was changing the coverage for Wegovy.  She knew was changed to tier 3 but unsure what that means.       Diet and Exercise: Diet history reviewed and discussed with the patient. Weight loss/gains to date discussed with the patient. The patient states they are eating 60 grams of protein per day. She reports eating 2 meals per day, a typical portion size of 1/2 cup, eating 1 snacks per day, drinking 2 or more 8-oz. glasses of water per day, no carbonated beverage consumption and exercising regularly.     Supplements: mtv.     Review of Systems   Constitutional:  Positive for activity change and appetite change.   Respiratory:  Negative for " shortness of breath.    Cardiovascular:  Negative for chest pain.   All other systems reviewed and are negative.      Patient Active Problem List   Diagnosis    Primary osteoarthritis of both knees    Chronic fatigue    Snoring    Hiatal hernia    Chronic pain of both knees    GERD (gastroesophageal reflux disease)    Obesity, Class II, BMI 35-39.9    Dietary counseling    S/P laparoscopic sleeve gastrectomy    Dysphagia    Obesity, Class I, BMI 30-34.9    Postoperative malabsorption       Past Medical History:   Diagnosis Date    GERD (gastroesophageal reflux disease)     Hiatal hernia     Primary osteoarthritis of both knees        The following portions of the patient's history were reviewed and updated as appropriate: allergies, current medications, past medical history, past surgical history, and problem list.    Vitals:    01/09/25 0943   BP: 129/66   Pulse: 70   Temp: 97.8 °F (36.6 °C)       Physical Exam  Vitals reviewed.   Constitutional:       General: She is not in acute distress.     Appearance: Normal appearance. She is morbidly obese and obese.   HENT:      Head: Normocephalic and atraumatic.      Mouth/Throat:      Mouth: Mucous membranes are moist.      Pharynx: Oropharynx is clear.   Eyes:      General: No scleral icterus.     Extraocular Movements: Extraocular movements intact.      Conjunctiva/sclera: Conjunctivae normal.      Pupils: Pupils are equal, round, and reactive to light.   Cardiovascular:      Rate and Rhythm: Normal rate and regular rhythm.   Pulmonary:      Effort: Pulmonary effort is normal. No respiratory distress.   Abdominal:      General: Bowel sounds are normal.      Palpations: Abdomen is soft.   Musculoskeletal:         General: Normal range of motion.      Cervical back: Normal range of motion and neck supple.   Skin:     General: Skin is warm and dry.   Neurological:      General: No focal deficit present.      Mental Status: She is alert and oriented to person, place, and  time.   Psychiatric:         Mood and Affect: Mood normal.         Behavior: Behavior normal.         Thought Content: Thought content normal.         Judgment: Judgment normal.         Assessment:   Post-op, the patient is doing well.  Is happy at current weight or a few pounds less.     Encounter Diagnoses   Name Primary?    Class 2 obesity with body mass index (BMI) of 35.0 to 35.9 in adult, unspecified obesity type, unspecified whether serious comorbidity present     Obesity, Class I, BMI 30-34.9 Yes    S/P laparoscopic sleeve gastrectomy     Chronic fatigue     Postoperative malabsorption     Dietary counseling        Plan:   Diagnoses and all orders for this visit:    1. Obesity, Class I, BMI 30-34.9 (Primary)  -     CBC & Differential; Future  -     Comprehensive Metabolic Panel; Future  -     Ferritin; Future  -     Folate; Future  -     Iron; Future  -     Methylmalonic Acid, Serum; Future  -     Prealbumin; Future  -     Vitamin B1, Whole Blood; Future  -     Vitamin D,25-Hydroxy; Future  -     Hemoglobin A1c; Future    2. Class 2 obesity with body mass index (BMI) of 35.0 to 35.9 in adult, unspecified obesity type, unspecified whether serious comorbidity present  -     Wegovy 1.7 MG/0.75ML solution auto-injector; Inject 0.75 mL under the skin into the appropriate area as directed 1 (One) Time Per Week.  Dispense: 3 mL; Refill: 2  -     CBC & Differential; Future  -     Comprehensive Metabolic Panel; Future  -     Ferritin; Future  -     Folate; Future  -     Iron; Future  -     Methylmalonic Acid, Serum; Future  -     Prealbumin; Future  -     Vitamin B1, Whole Blood; Future  -     Vitamin D,25-Hydroxy; Future  -     Hemoglobin A1c; Future    3. S/P laparoscopic sleeve gastrectomy  -     CBC & Differential; Future  -     Comprehensive Metabolic Panel; Future  -     Ferritin; Future  -     Folate; Future  -     Iron; Future  -     Methylmalonic Acid, Serum; Future  -     Prealbumin; Future  -     Vitamin  B1, Whole Blood; Future  -     Vitamin D,25-Hydroxy; Future  -     Hemoglobin A1c; Future    4. Chronic fatigue  -     CBC & Differential; Future  -     Comprehensive Metabolic Panel; Future  -     Ferritin; Future  -     Folate; Future  -     Iron; Future  -     Methylmalonic Acid, Serum; Future  -     Prealbumin; Future  -     Vitamin B1, Whole Blood; Future  -     Vitamin D,25-Hydroxy; Future  -     Hemoglobin A1c; Future    5. Postoperative malabsorption  -     CBC & Differential; Future  -     Comprehensive Metabolic Panel; Future  -     Ferritin; Future  -     Folate; Future  -     Iron; Future  -     Methylmalonic Acid, Serum; Future  -     Prealbumin; Future  -     Vitamin B1, Whole Blood; Future  -     Vitamin D,25-Hydroxy; Future  -     Hemoglobin A1c; Future    6. Dietary counseling      Reviewed the limited information I have on Wegovy and her insurance coverage.  What I can find on the internet states that it changed to a tier 3 medication this year which has a 60% coinsurance.    Did discuss potential for Qsymia as well  Will try to refill Wegovy and she will see what pharmacy tells her that her cost will be  Will get labs  Encouraged patient to be sure to get plenty of lean protein per day through small frequent meals all with a protein source.   Activity restrictions: none.   Recommended patient be sure to get at least 70 grams of protein per day by eating small, frequent meals all with high lean protein choices. Be sure to limit/cut back on daily carbohydrate intake. Discussed with the patient the recommended amount of water per day to intake- half of body weight in ounces. Reviewed vitamin requirements. Be sure to do routine exercise, 150 minutes per week minimum, including both cardio and strength training.     Instructions / Recommendations: dietary counseling recommended, recommended a daily protein intake of  grams, vitamin supplement(s) recommended, recommended exercising at least 150  minutes per week, behavior modifications recommended and instructed to call the office for concerns, questions, or problems.     The patient was instructed to follow up in 12 months for surgical follow up and rto in 6 months if on medication.     Total time spent during this encounter today was 35 minutes

## 2025-01-10 DIAGNOSIS — E66.812 CLASS 2 OBESITY WITH BODY MASS INDEX (BMI) OF 35.0 TO 35.9 IN ADULT, UNSPECIFIED OBESITY TYPE, UNSPECIFIED WHETHER SERIOUS COMORBIDITY PRESENT: ICD-10-CM

## 2025-01-13 RX ORDER — SEMAGLUTIDE 1.7 MG/.75ML
INJECTION, SOLUTION SUBCUTANEOUS
Qty: 4 ML | Refills: 0 | OUTPATIENT
Start: 2025-01-13

## 2025-01-14 LAB — METHYLMALONATE SERPL-SCNC: 87 NMOL/L (ref 0–378)

## 2025-01-15 LAB — VIT B1 BLD-SCNC: 125 NMOL/L (ref 66.5–200)

## (undated) DEVICE — LN SMPL CO2 SHTRM SD STREAM W/M LUER

## (undated) DEVICE — VIOLET BRAIDED (POLYGLACTIN 910), SYNTHETIC ABSORBABLE SUTURE: Brand: COATED VICRYL

## (undated) DEVICE — ADAPT CLN BIOGUARD AIR/H2O DISP

## (undated) DEVICE — MARKR SKIN W/RULR AND LBL

## (undated) DEVICE — LAPAROSCOPIC DISSECTOR: Brand: DEROYAL

## (undated) DEVICE — GLV SURG SENSICARE POLYISPRN W/ALOE PF LF 6.5 GRN STRL

## (undated) DEVICE — MSK PROC CURAPLEX O2 2/ADAPT 7FT

## (undated) DEVICE — SENSR O2 OXIMAX FNGR A/ 18IN NONSTR

## (undated) DEVICE — 500ML,PRESSURE INFUSER W/STOPCOCK: Brand: MEDLINE

## (undated) DEVICE — ENSEAL LAPAROSCOPIC TISSUE SEALER G2 ARTICULATING CURVED JAW FOR USE WITH G2 GENERATOR 5MM DIAMETER 45CM SHAFT LENGTH: Brand: ENSEAL

## (undated) DEVICE — APL DUPLOSPRAYER MIS 40CM

## (undated) DEVICE — SOL NACL 0.9PCT 1000ML

## (undated) DEVICE — BITEBLOCK OMNI BLOC

## (undated) DEVICE — SYR LUERLOK 20CC BX/50

## (undated) DEVICE — KT ORCA ORCAPOD DISP STRL

## (undated) DEVICE — CONN TBG Y 5 IN 1 LF STRL

## (undated) DEVICE — TUBING, SUCTION, 1/4" X 10', STRAIGHT: Brand: MEDLINE

## (undated) DEVICE — DEV INFL CRE STERIFLATE 60CC DISP

## (undated) DEVICE — CONTAINER,SPECIMEN,OR STERILE,4OZ: Brand: MEDLINE

## (undated) DEVICE — LAPAROSCOPIC SMOKE FILTRATION SYSTEM: Brand: PALL LAPAROSHIELD® PLUS LAPAROSCOPIC SMOKE FILTRATION SYSTEM

## (undated) DEVICE — GLV SURG SENSICARE PI MIC PF SZ8.5 LF STRL

## (undated) DEVICE — DISPOSABLE MONOPOLAR ENDOSCOPIC CORD 10 FT. (3M): Brand: KIRWAN

## (undated) DEVICE — FRCP BX RADJAW4 NDL 2.8 240CM LG OG BX40

## (undated) DEVICE — LAPAROVUE VISIBILITY SYSTEM LAPAROSCOPIC SOLUTIONS: Brand: LAPAROVUE

## (undated) DEVICE — ESOPHAGEAL BALLOON DILATATION CATHETER: Brand: CRE FIXED WIRE

## (undated) DEVICE — GLV SURG SENSICARE PI PF LF 8.5 GRN STRL

## (undated) DEVICE — DECANTER BAG 9": Brand: MEDLINE INDUSTRIES, INC.

## (undated) DEVICE — TROCAR: Brand: KII OPTICAL ACCESS SYSTEM

## (undated) DEVICE — Device: Brand: STANDARD BOUGIE, 38FR

## (undated) DEVICE — NDL HYPO PRECISIONGLIDE REG 20G 1 1/2

## (undated) DEVICE — GLV SURG SENSICARE PI MIC PF SZ6 LF STRL

## (undated) DEVICE — PK OSC LAP SLV 40

## (undated) DEVICE — SEALANT WND FIBRIN TISSEEL PREFIL/SYR/PRIMAFZ 4ML

## (undated) DEVICE — TROC STANDARDTROCAR FOR/TITANSGS 19MM DISP STRL